# Patient Record
Sex: FEMALE | Race: WHITE | NOT HISPANIC OR LATINO | URBAN - METROPOLITAN AREA
[De-identification: names, ages, dates, MRNs, and addresses within clinical notes are randomized per-mention and may not be internally consistent; named-entity substitution may affect disease eponyms.]

---

## 2017-09-15 ENCOUNTER — EMERGENCY (EMERGENCY)
Facility: HOSPITAL | Age: 72
LOS: 1 days | Discharge: HOME | End: 2017-09-15
Admitting: EMERGENCY MEDICINE
Payer: MEDICARE

## 2017-09-15 VITALS
OXYGEN SATURATION: 98 % | SYSTOLIC BLOOD PRESSURE: 160 MMHG | HEART RATE: 92 BPM | RESPIRATION RATE: 20 BRPM | DIASTOLIC BLOOD PRESSURE: 85 MMHG | TEMPERATURE: 97 F
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
## 2017-09-15 DIAGNOSIS — E78.5 HYPERLIPIDEMIA, UNSPECIFIED: ICD-10-CM
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
## 2017-09-15 DIAGNOSIS — S93.401A SPRAIN OF UNSPECIFIED LIGAMENT OF RIGHT ANKLE, INITIAL ENCOUNTER: ICD-10-CM

## 2017-09-15 DIAGNOSIS — Z88.8 ALLERGY STATUS TO OTHER DRUGS, MEDICAMENTS AND BIOLOGICAL SUBSTANCES STATUS: ICD-10-CM
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
## 2017-09-15 DIAGNOSIS — S80.01XA CONTUSION OF RIGHT KNEE, INITIAL ENCOUNTER: ICD-10-CM

## 2017-09-15 DIAGNOSIS — I10 ESSENTIAL (PRIMARY) HYPERTENSION: ICD-10-CM

## 2017-09-15 DIAGNOSIS — M25.561 PAIN IN RIGHT KNEE: ICD-10-CM

## 2017-09-15 DIAGNOSIS — Z79.891 LONG TERM (CURRENT) USE OF OPIATE ANALGESIC: ICD-10-CM

## 2017-09-15 PROCEDURE — 73610 X-RAY EXAM OF ANKLE: CPT

## 2017-09-15 PROCEDURE — 73562 X-RAY EXAM OF KNEE 3: CPT | Mod: 26,RT

## 2017-09-15 PROCEDURE — 29515 APPLICATION SHORT LEG SPLINT: CPT | Mod: RT

## 2017-09-15 PROCEDURE — 73610 X-RAY EXAM OF ANKLE: CPT | Mod: 26,RT

## 2017-09-15 PROCEDURE — 29515 APPLICATION SHORT LEG SPLINT: CPT

## 2017-09-15 PROCEDURE — 99283 EMERGENCY DEPT VISIT LOW MDM: CPT | Mod: 25

## 2017-09-15 PROCEDURE — 99284 EMERGENCY DEPT VISIT MOD MDM: CPT | Mod: 25

## 2017-09-15 PROCEDURE — 73562 X-RAY EXAM OF KNEE 3: CPT

## 2017-09-15 RX ORDER — OXYCODONE AND ACETAMINOPHEN 5; 325 MG/1; MG/1
1 TABLET ORAL ONCE
Qty: 0 | Refills: 0 | Status: DISCONTINUED | OUTPATIENT
Start: 2017-09-15 | End: 2017-09-15

## 2017-09-15 RX ADMIN — OXYCODONE AND ACETAMINOPHEN 1 TABLET(S): 5; 325 TABLET ORAL at 16:09

## 2017-09-15 NOTE — ED PROVIDER NOTE - PROGRESS NOTE DETAILS
PA had difficulty with eprescription, multiple attempts w/o success, called IT w/o resolution of issue.  I did not evaluate the pt but able to prescribe the medication after treatment and evaluation by PA and discussion of IT issue w PA.  ZH pt with DJD explained possibility of fracture or soft tissue damage  and will FU with ortho

## 2017-09-15 NOTE — ED PROVIDER NOTE - MEDICAL DECISION MAKING DETAILS
fall today possible soft tissue knee splinted and ankle splinted pt has cane will DC I have discussed the discharge plan with the patient. The patient agrees with the plan, as discussed.  The patient understands Emergency Department diagnosis is a preliminary diagnosis often based on limited information and that the patient must adhere to the follow-up plan as discussed.  The patient understands that if the symptoms worsen or if prescribed medications do not have the desired/planned effect that the patient may return to the Emergency Department at any time for further evaluation and treatment.

## 2017-09-15 NOTE — ED ADULT NURSE NOTE - OBJECTIVE STATEMENT
72 year old female patient with c/o of bilateral knee pain, R more than L.  S/p of mechanical fall earlier this morning.  Denies loc/dizzines/head trauma.

## 2017-09-15 NOTE — ED PROVIDER NOTE - OBJECTIVE STATEMENT
pt to ed co pain to right knee with swelling and right ankle after trip and fall no LOC no neck nor back pain no head injury no fever no dizzy no headache no chills no NVD no chest pain no SOB no shakes no aches no other  injury no other complaints

## 2017-09-15 NOTE — ED PROVIDER NOTE - CARE PLAN
Principal Discharge DX:	Contusion of right knee, initial encounter  Secondary Diagnosis:	Sprain of other ligament of right ankle, initial encounter

## 2017-09-17 NOTE — ED POST DISCHARGE NOTE - ADDITIONAL DOCUMENTATION
Pt notified, she has ortho follow up at hospitals. She was told get additional imaging CT/MRI then if needed and/or to return to ER for any concerning or worsening sx.

## 2018-12-16 ENCOUNTER — INPATIENT (INPATIENT)
Facility: HOSPITAL | Age: 73
LOS: 1 days | Discharge: OTHER INSTITUTION | DRG: 917 | End: 2018-12-18
Attending: INTERNAL MEDICINE | Admitting: INTERNAL MEDICINE
Payer: MEDICARE

## 2018-12-16 VITALS — DIASTOLIC BLOOD PRESSURE: 92 MMHG | HEART RATE: 90 BPM | SYSTOLIC BLOOD PRESSURE: 124 MMHG | OXYGEN SATURATION: 100 %

## 2018-12-16 LAB
ALBUMIN SERPL ELPH-MCNC: 3.4 G/DL — SIGNIFICANT CHANGE UP (ref 3.3–5)
ALBUMIN SERPL ELPH-MCNC: 3.5 G/DL — SIGNIFICANT CHANGE UP (ref 3.3–5)
ALP SERPL-CCNC: 103 U/L — SIGNIFICANT CHANGE UP (ref 40–120)
ALP SERPL-CCNC: 113 U/L — SIGNIFICANT CHANGE UP (ref 40–120)
ALT FLD-CCNC: 3319 U/L — HIGH (ref 10–45)
ALT FLD-CCNC: 3878 U/L — HIGH (ref 10–45)
ANION GAP SERPL CALC-SCNC: 47 MMOL/L — HIGH (ref 5–17)
ANION GAP SERPL CALC-SCNC: 49 MMOL/L — HIGH (ref 5–17)
APPEARANCE UR: ABNORMAL
APTT BLD: 36 SEC — SIGNIFICANT CHANGE UP (ref 27.5–36.3)
APTT BLD: 42.5 SEC — HIGH (ref 27.5–36.3)
AST SERPL-CCNC: 6740 U/L — HIGH (ref 10–40)
AST SERPL-CCNC: >7000 U/L — SIGNIFICANT CHANGE UP (ref 10–40)
BASE EXCESS BLDA CALC-SCNC: -17.2 MMOL/L — LOW (ref -2–3)
BILIRUB SERPL-MCNC: 2.4 MG/DL — HIGH (ref 0.2–1.2)
BILIRUB SERPL-MCNC: 2.6 MG/DL — HIGH (ref 0.2–1.2)
BILIRUB UR-MCNC: ABNORMAL
BLD GP AB SCN SERPL QL: NEGATIVE — SIGNIFICANT CHANGE UP
BUN SERPL-MCNC: 21 MG/DL — SIGNIFICANT CHANGE UP (ref 7–23)
BUN SERPL-MCNC: 23 MG/DL — SIGNIFICANT CHANGE UP (ref 7–23)
CALCIUM SERPL-MCNC: 8.5 MG/DL — SIGNIFICANT CHANGE UP (ref 8.4–10.5)
CALCIUM SERPL-MCNC: 9 MG/DL — SIGNIFICANT CHANGE UP (ref 8.4–10.5)
CHLORIDE SERPL-SCNC: 86 MMOL/L — LOW (ref 96–108)
CHLORIDE SERPL-SCNC: 92 MMOL/L — LOW (ref 96–108)
CO2 SERPL-SCNC: 6 MMOL/L — CRITICAL LOW (ref 22–31)
CO2 SERPL-SCNC: 7 MMOL/L — CRITICAL LOW (ref 22–31)
COLOR SPEC: YELLOW — SIGNIFICANT CHANGE UP
CREAT SERPL-MCNC: 1.97 MG/DL — HIGH (ref 0.5–1.3)
CREAT SERPL-MCNC: 2.14 MG/DL — HIGH (ref 0.5–1.3)
DIFF PNL FLD: ABNORMAL
EOSINOPHIL NFR BLD AUTO: 1 % — SIGNIFICANT CHANGE UP (ref 0–6)
ETHANOL SERPL-MCNC: <10 MG/DL — SIGNIFICANT CHANGE UP (ref 0–10)
GAS PNL BLDV: SIGNIFICANT CHANGE UP
GLUCOSE SERPL-MCNC: 144 MG/DL — HIGH (ref 70–99)
GLUCOSE SERPL-MCNC: 364 MG/DL — HIGH (ref 70–99)
GLUCOSE UR QL: NEGATIVE — SIGNIFICANT CHANGE UP
HCO3 BLDA-SCNC: 8 MMOL/L — CRITICAL LOW (ref 21–28)
HCT VFR BLD CALC: 31.9 % — LOW (ref 34.5–45)
HCT VFR BLD CALC: 31.9 % — LOW (ref 34.5–45)
HGB BLD-MCNC: 9.2 G/DL — LOW (ref 11.5–15.5)
HGB BLD-MCNC: 9.3 G/DL — LOW (ref 11.5–15.5)
INR BLD: 3.83 — HIGH (ref 0.88–1.16)
INR BLD: 4.12 — HIGH (ref 0.88–1.16)
KETONES UR-MCNC: NEGATIVE — SIGNIFICANT CHANGE UP
LACTATE SERPL-SCNC: 18 MMOL/L — CRITICAL HIGH (ref 0.5–2)
LACTATE SERPL-SCNC: 20 MMOL/L — CRITICAL HIGH (ref 0.5–2)
LACTATE SERPL-SCNC: 21 MMOL/L — CRITICAL HIGH (ref 0.5–2)
LEUKOCYTE ESTERASE UR-ACNC: NEGATIVE — SIGNIFICANT CHANGE UP
LYMPHOCYTES # BLD AUTO: 6 % — LOW (ref 13–44)
MAGNESIUM SERPL-MCNC: 1.8 MG/DL — SIGNIFICANT CHANGE UP (ref 1.6–2.6)
MCHC RBC-ENTMCNC: 28.8 G/DL — LOW (ref 32–36)
MCHC RBC-ENTMCNC: 29.1 PG — SIGNIFICANT CHANGE UP (ref 27–34)
MCHC RBC-ENTMCNC: 29.2 G/DL — LOW (ref 32–36)
MCHC RBC-ENTMCNC: 29.4 PG — SIGNIFICANT CHANGE UP (ref 27–34)
MCV RBC AUTO: 100.9 FL — HIGH (ref 80–100)
MCV RBC AUTO: 100.9 FL — HIGH (ref 80–100)
MONOCYTES NFR BLD AUTO: 1 % — LOW (ref 2–14)
NEUTROPHILS NFR BLD AUTO: 83 % — HIGH (ref 43–77)
NITRITE UR-MCNC: NEGATIVE — SIGNIFICANT CHANGE UP
OSMOLALITY SERPL: 318 MOSM/KG — HIGH (ref 280–301)
OSMOLALITY UR: 345 MOSMOL/KG — SIGNIFICANT CHANGE UP (ref 100–650)
PCO2 BLDA: 18 MMHG — LOW (ref 32–45)
PCP SPEC-MCNC: SIGNIFICANT CHANGE UP
PH BLDA: 7.27 — LOW (ref 7.35–7.45)
PH UR: 5 — SIGNIFICANT CHANGE UP (ref 5–8)
PHOSPHATE SERPL-MCNC: 9.8 MG/DL — HIGH (ref 2.5–4.5)
PLATELET # BLD AUTO: 406 K/UL — HIGH (ref 150–400)
PLATELET # BLD AUTO: 412 K/UL — HIGH (ref 150–400)
PO2 BLDA: 233 MMHG — HIGH (ref 83–108)
POTASSIUM SERPL-MCNC: 3.7 MMOL/L — SIGNIFICANT CHANGE UP (ref 3.5–5.3)
POTASSIUM SERPL-MCNC: 4.3 MMOL/L — SIGNIFICANT CHANGE UP (ref 3.5–5.3)
POTASSIUM SERPL-SCNC: 3.7 MMOL/L — SIGNIFICANT CHANGE UP (ref 3.5–5.3)
POTASSIUM SERPL-SCNC: 4.3 MMOL/L — SIGNIFICANT CHANGE UP (ref 3.5–5.3)
PROT SERPL-MCNC: 5.8 G/DL — LOW (ref 6–8.3)
PROT SERPL-MCNC: 6.2 G/DL — SIGNIFICANT CHANGE UP (ref 6–8.3)
PROT UR-MCNC: 30 MG/DL
PROTHROM AB SERPL-ACNC: 45.1 SEC — HIGH (ref 10–12.9)
PROTHROM AB SERPL-ACNC: 48.7 SEC — HIGH (ref 10–12.9)
RBC # BLD: 3.16 M/UL — LOW (ref 3.8–5.2)
RBC # BLD: 3.16 M/UL — LOW (ref 3.8–5.2)
RBC # FLD: 15.7 % — SIGNIFICANT CHANGE UP (ref 10.3–16.9)
RBC # FLD: 15.9 % — SIGNIFICANT CHANGE UP (ref 10.3–16.9)
RH IG SCN BLD-IMP: NEGATIVE — SIGNIFICANT CHANGE UP
SALICYLATES SERPL-MCNC: 1.6 MG/DL — LOW (ref 2.8–20)
SAO2 % BLDA: 100 % — SIGNIFICANT CHANGE UP (ref 95–100)
SODIUM SERPL-SCNC: 142 MMOL/L — SIGNIFICANT CHANGE UP (ref 135–145)
SODIUM SERPL-SCNC: 145 MMOL/L — SIGNIFICANT CHANGE UP (ref 135–145)
SP GR SPEC: >=1.03 — SIGNIFICANT CHANGE UP (ref 1–1.03)
TROPONIN T SERPL-MCNC: 0.11 NG/ML — CRITICAL HIGH (ref 0–0.01)
UROBILINOGEN FLD QL: 0.2 E.U./DL — SIGNIFICANT CHANGE UP
WBC # BLD: 23.3 K/UL — HIGH (ref 3.8–10.5)
WBC # BLD: 23.5 K/UL — HIGH (ref 3.8–10.5)
WBC # FLD AUTO: 23.3 K/UL — HIGH (ref 3.8–10.5)
WBC # FLD AUTO: 23.5 K/UL — HIGH (ref 3.8–10.5)

## 2018-12-16 PROCEDURE — 72125 CT NECK SPINE W/O DYE: CPT | Mod: 26

## 2018-12-16 PROCEDURE — 71045 X-RAY EXAM CHEST 1 VIEW: CPT | Mod: 26,76

## 2018-12-16 PROCEDURE — 99291 CRITICAL CARE FIRST HOUR: CPT | Mod: 25

## 2018-12-16 PROCEDURE — 74176 CT ABD & PELVIS W/O CONTRAST: CPT | Mod: 26

## 2018-12-16 PROCEDURE — 74018 RADEX ABDOMEN 1 VIEW: CPT | Mod: 26

## 2018-12-16 PROCEDURE — 71250 CT THORAX DX C-: CPT | Mod: 26

## 2018-12-16 PROCEDURE — 70450 CT HEAD/BRAIN W/O DYE: CPT | Mod: 26

## 2018-12-16 PROCEDURE — 36556 INSERT NON-TUNNEL CV CATH: CPT

## 2018-12-16 PROCEDURE — 99292 CRITICAL CARE ADDL 30 MIN: CPT | Mod: 25

## 2018-12-16 PROCEDURE — 31500 INSERT EMERGENCY AIRWAY: CPT

## 2018-12-16 RX ORDER — PROPOFOL 10 MG/ML
5 INJECTION, EMULSION INTRAVENOUS
Qty: 1000 | Refills: 0 | Status: DISCONTINUED | OUTPATIENT
Start: 2018-12-16 | End: 2018-12-17

## 2018-12-16 RX ORDER — DEXTROSE 50 % IN WATER 50 %
25 SYRINGE (ML) INTRAVENOUS ONCE
Qty: 0 | Refills: 0 | Status: DISCONTINUED | OUTPATIENT
Start: 2018-12-16 | End: 2018-12-17

## 2018-12-16 RX ORDER — SODIUM CHLORIDE 9 MG/ML
1000 INJECTION, SOLUTION INTRAVENOUS ONCE
Qty: 0 | Refills: 0 | Status: COMPLETED | OUTPATIENT
Start: 2018-12-16 | End: 2018-12-16

## 2018-12-16 RX ORDER — LINEZOLID 600 MG/300ML
600 INJECTION, SOLUTION INTRAVENOUS EVERY 12 HOURS
Qty: 0 | Refills: 0 | Status: COMPLETED | OUTPATIENT
Start: 2018-12-16 | End: 2018-12-17

## 2018-12-16 RX ORDER — SODIUM CHLORIDE 9 MG/ML
1000 INJECTION, SOLUTION INTRAVENOUS
Qty: 0 | Refills: 0 | Status: DISCONTINUED | OUTPATIENT
Start: 2018-12-16 | End: 2018-12-17

## 2018-12-16 RX ORDER — LINEZOLID 600 MG/300ML
600 INJECTION, SOLUTION INTRAVENOUS EVERY 12 HOURS
Qty: 0 | Refills: 0 | Status: DISCONTINUED | OUTPATIENT
Start: 2018-12-16 | End: 2018-12-17

## 2018-12-16 RX ORDER — PIPERACILLIN AND TAZOBACTAM 4; .5 G/20ML; G/20ML
3.38 INJECTION, POWDER, LYOPHILIZED, FOR SOLUTION INTRAVENOUS ONCE
Qty: 0 | Refills: 0 | Status: COMPLETED | OUTPATIENT
Start: 2018-12-16 | End: 2018-12-16

## 2018-12-16 RX ORDER — GLUCAGON INJECTION, SOLUTION 0.5 MG/.1ML
1 INJECTION, SOLUTION SUBCUTANEOUS ONCE
Qty: 0 | Refills: 0 | Status: DISCONTINUED | OUTPATIENT
Start: 2018-12-16 | End: 2018-12-17

## 2018-12-16 RX ORDER — NOREPINEPHRINE BITARTRATE/D5W 8 MG/250ML
0.05 PLASTIC BAG, INJECTION (ML) INTRAVENOUS
Qty: 8 | Refills: 0 | Status: DISCONTINUED | OUTPATIENT
Start: 2018-12-16 | End: 2018-12-17

## 2018-12-16 RX ORDER — PANTOPRAZOLE SODIUM 20 MG/1
40 TABLET, DELAYED RELEASE ORAL
Qty: 0 | Refills: 0 | Status: DISCONTINUED | OUTPATIENT
Start: 2018-12-17 | End: 2018-12-18

## 2018-12-16 RX ORDER — SODIUM BICARBONATE 1 MEQ/ML
50 SYRINGE (ML) INTRAVENOUS ONCE
Qty: 0 | Refills: 0 | Status: COMPLETED | OUTPATIENT
Start: 2018-12-16 | End: 2018-12-16

## 2018-12-16 RX ORDER — PANTOPRAZOLE SODIUM 20 MG/1
80 TABLET, DELAYED RELEASE ORAL ONCE
Qty: 0 | Refills: 0 | Status: COMPLETED | OUTPATIENT
Start: 2018-12-16 | End: 2018-12-16

## 2018-12-16 RX ORDER — SODIUM BICARBONATE 1 MEQ/ML
100 SYRINGE (ML) INTRAVENOUS ONCE
Qty: 0 | Refills: 0 | Status: DISCONTINUED | OUTPATIENT
Start: 2018-12-16 | End: 2018-12-16

## 2018-12-16 RX ORDER — FENTANYL CITRATE 50 UG/ML
50 INJECTION INTRAVENOUS ONCE
Qty: 0 | Refills: 0 | Status: DISCONTINUED | OUTPATIENT
Start: 2018-12-16 | End: 2018-12-16

## 2018-12-16 RX ORDER — SODIUM CHLORIDE 9 MG/ML
3000 INJECTION INTRAMUSCULAR; INTRAVENOUS; SUBCUTANEOUS ONCE
Qty: 0 | Refills: 0 | Status: COMPLETED | OUTPATIENT
Start: 2018-12-16 | End: 2018-12-16

## 2018-12-16 RX ORDER — VANCOMYCIN HCL 1 G
1000 VIAL (EA) INTRAVENOUS ONCE
Qty: 0 | Refills: 0 | Status: DISCONTINUED | OUTPATIENT
Start: 2018-12-16 | End: 2018-12-16

## 2018-12-16 RX ORDER — MIDAZOLAM HYDROCHLORIDE 1 MG/ML
4 INJECTION, SOLUTION INTRAMUSCULAR; INTRAVENOUS ONCE
Qty: 0 | Refills: 0 | Status: DISCONTINUED | OUTPATIENT
Start: 2018-12-16 | End: 2018-12-16

## 2018-12-16 RX ORDER — DEXTROSE 50 % IN WATER 50 %
50 SYRINGE (ML) INTRAVENOUS ONCE
Qty: 0 | Refills: 0 | Status: COMPLETED | OUTPATIENT
Start: 2018-12-16 | End: 2018-12-16

## 2018-12-16 RX ORDER — LINEZOLID 600 MG/300ML
600 INJECTION, SOLUTION INTRAVENOUS EVERY 12 HOURS
Qty: 0 | Refills: 0 | Status: DISCONTINUED | OUTPATIENT
Start: 2018-12-16 | End: 2018-12-16

## 2018-12-16 RX ORDER — DEXTROSE 50 % IN WATER 50 %
15 SYRINGE (ML) INTRAVENOUS ONCE
Qty: 0 | Refills: 0 | Status: DISCONTINUED | OUTPATIENT
Start: 2018-12-16 | End: 2018-12-17

## 2018-12-16 RX ORDER — DEXTROSE 50 % IN WATER 50 %
25 SYRINGE (ML) INTRAVENOUS ONCE
Qty: 0 | Refills: 0 | Status: COMPLETED | OUTPATIENT
Start: 2018-12-16 | End: 2018-12-16

## 2018-12-16 RX ORDER — SUCCINYLCHOLINE CHLORIDE 100 MG/5ML
100 SYRINGE (ML) INTRAVENOUS ONCE
Qty: 0 | Refills: 0 | Status: COMPLETED | OUTPATIENT
Start: 2018-12-16 | End: 2018-12-16

## 2018-12-16 RX ORDER — MIDAZOLAM HYDROCHLORIDE 1 MG/ML
2 INJECTION, SOLUTION INTRAMUSCULAR; INTRAVENOUS ONCE
Qty: 0 | Refills: 0 | Status: DISCONTINUED | OUTPATIENT
Start: 2018-12-16 | End: 2018-12-16

## 2018-12-16 RX ORDER — ETOMIDATE 2 MG/ML
20 INJECTION INTRAVENOUS ONCE
Qty: 0 | Refills: 0 | Status: COMPLETED | OUTPATIENT
Start: 2018-12-16 | End: 2018-12-16

## 2018-12-16 RX ORDER — PIPERACILLIN AND TAZOBACTAM 4; .5 G/20ML; G/20ML
3.38 INJECTION, POWDER, LYOPHILIZED, FOR SOLUTION INTRAVENOUS EVERY 6 HOURS
Qty: 0 | Refills: 0 | Status: DISCONTINUED | OUTPATIENT
Start: 2018-12-17 | End: 2018-12-17

## 2018-12-16 RX ORDER — SODIUM CHLORIDE 9 MG/ML
1000 INJECTION, SOLUTION INTRAVENOUS
Qty: 0 | Refills: 0 | Status: DISCONTINUED | OUTPATIENT
Start: 2018-12-16 | End: 2018-12-18

## 2018-12-16 RX ORDER — INSULIN LISPRO 100/ML
VIAL (ML) SUBCUTANEOUS
Qty: 0 | Refills: 0 | Status: DISCONTINUED | OUTPATIENT
Start: 2018-12-16 | End: 2018-12-17

## 2018-12-16 RX ORDER — DEXTROSE 50 % IN WATER 50 %
12.5 SYRINGE (ML) INTRAVENOUS ONCE
Qty: 0 | Refills: 0 | Status: DISCONTINUED | OUTPATIENT
Start: 2018-12-16 | End: 2018-12-17

## 2018-12-16 RX ORDER — SODIUM CHLORIDE 9 MG/ML
1000 INJECTION, SOLUTION INTRAVENOUS
Qty: 0 | Refills: 0 | Status: DISCONTINUED | OUTPATIENT
Start: 2018-12-16 | End: 2018-12-16

## 2018-12-16 RX ADMIN — Medication 50 MILLILITER(S): at 22:17

## 2018-12-16 RX ADMIN — PROPOFOL 1.57 MICROGRAM(S)/KG/MIN: 10 INJECTION, EMULSION INTRAVENOUS at 21:25

## 2018-12-16 RX ADMIN — ETOMIDATE 20 MILLIGRAM(S): 2 INJECTION INTRAVENOUS at 16:58

## 2018-12-16 RX ADMIN — Medication 50 MILLIEQUIVALENT(S): at 20:22

## 2018-12-16 RX ADMIN — SODIUM CHLORIDE 3000 MILLILITER(S): 9 INJECTION INTRAMUSCULAR; INTRAVENOUS; SUBCUTANEOUS at 16:30

## 2018-12-16 RX ADMIN — MIDAZOLAM HYDROCHLORIDE 4 MILLIGRAM(S): 1 INJECTION, SOLUTION INTRAMUSCULAR; INTRAVENOUS at 19:28

## 2018-12-16 RX ADMIN — SODIUM CHLORIDE 2000 MILLILITER(S): 9 INJECTION, SOLUTION INTRAVENOUS at 23:27

## 2018-12-16 RX ADMIN — SODIUM CHLORIDE 2000 MILLILITER(S): 9 INJECTION, SOLUTION INTRAVENOUS at 20:36

## 2018-12-16 RX ADMIN — Medication 100 MILLIGRAM(S): at 16:59

## 2018-12-16 RX ADMIN — SODIUM CHLORIDE 2000 MILLILITER(S): 9 INJECTION, SOLUTION INTRAVENOUS at 22:02

## 2018-12-16 RX ADMIN — Medication 4.91 MICROGRAM(S)/KG/MIN: at 19:29

## 2018-12-16 RX ADMIN — PIPERACILLIN AND TAZOBACTAM 200 GRAM(S): 4; .5 INJECTION, POWDER, LYOPHILIZED, FOR SOLUTION INTRAVENOUS at 20:22

## 2018-12-16 RX ADMIN — FENTANYL CITRATE 50 MICROGRAM(S): 50 INJECTION INTRAVENOUS at 17:30

## 2018-12-16 RX ADMIN — PANTOPRAZOLE SODIUM 80 MILLIGRAM(S): 20 TABLET, DELAYED RELEASE ORAL at 23:26

## 2018-12-16 RX ADMIN — Medication 25 MILLILITER(S): at 16:30

## 2018-12-16 RX ADMIN — MIDAZOLAM HYDROCHLORIDE 2 MILLIGRAM(S): 1 INJECTION, SOLUTION INTRAMUSCULAR; INTRAVENOUS at 17:30

## 2018-12-16 NOTE — H&P ADULT - NSHPLABSRESULTS_GEN_ALL_CORE
9.2    23.3  )-----------( 406      ( 16 Dec 2018 16:45 )             31.9       12-16    142  |  86<L>  |  23  ----------------------------<  364<H>  4.3   |  7<LL>  |  2.14<H>    Ca    9.0      16 Dec 2018 16:45    TPro  5.8<L>  /  Alb  3.4  /  TBili  2.4<H>  /  DBili  x   /  AST  6740<H>  /  ALT  3319<H>  /  AlkPhos  103  12-16              Urinalysis Basic - ( 16 Dec 2018 19:12 )    Color: Yellow / Appearance: SL Cloudy / SG: >=1.030 / pH: x  Gluc: x / Ketone: NEGATIVE  / Bili: Small / Urobili: 0.2 E.U./dL   Blood: x / Protein: 30 mg/dL / Nitrite: NEGATIVE   Leuk Esterase: NEGATIVE / RBC: x / WBC x   Sq Epi: x / Non Sq Epi: x / Bacteria: x        PT/INR - ( 16 Dec 2018 16:45 )   PT: 45.1 sec;   INR: 3.83          PTT - ( 16 Dec 2018 16:45 )  PTT:36.0 sec    Lactate Trend  12-16 @ 16:48 Lactate:21.0      CARDIAC MARKERS ( 16 Dec 2018 16:45 )  x     / 0.11 ng/mL / 904 U/L / x     / 30.8 ng/mL        CAPILLARY BLOOD GLUCOSE  11 (16 Dec 2018 17:01)      POCT Blood Glucose.: 333 mg/dL (16 Dec 2018 16:49)

## 2018-12-16 NOTE — ED PROVIDER NOTE - SECONDARY DIAGNOSIS.
Altered mental status, unspecified altered mental status type Lactic acidosis Leukocytosis, unspecified type Renal failure, unspecified chronicity Electrolyte disturbance

## 2018-12-16 NOTE — PROCEDURE NOTE - PROCEDURE
<<-----Click on this checkbox to enter Procedure Central line placement  12/16/2018    Active  [id number]

## 2018-12-16 NOTE — H&P ADULT - ASSESSMENT
Assessment and Plan:     73F with h/o EtOH abuse, presenting with Anion gap metabolic acidosis likely due to lactic acidosis, septic shock requiring pressors, transferred to MICU for further monitoring.     Neurology:  - intubated, sedated     Pulmonary:  #Acute     Cardiology    GI:  #Anion gap metabolic acidosis in setting lactic acidosis - etiologies include     #Shock liver with severe transaminitis       :    MSK:    Endocrine:    ID:    FEN: NPO, Replete lytes PRN K>4, Mg>2    Lines/Mo:     PPx: Hep SQ, SCDs    Code: FULL CODE    Dispo: Patient requires continued monitoring in MICU Assessment and Plan:     73F with h/o EtOH abuse, presenting with Anion gap metabolic acidosis likely due to lactic acidosis, septic shock requiring pressors, transferred to MICU for further monitoring.     Neurology:  - intubated, sedated     #Stroke Code - called     Pulmonary:  #Acute respiratory failure s/p intubated   - continue AC/MV  - repeat VBG with pH 7.02/ HCO3 8   -     Cardiology    GI:  #Anion gap metabolic acidosis in setting lactic acidosis - etiologies includes salicylate tox vs. ?methanol poisoning,    - f/u salicylate levels   - Trend CMPs    # Acute liver failure - likely ischemic hepatitis, with severe transaminitis, coagulopathy - likely due to severe hypoperfusion in setting of lactic acidosis   - AST 6740, ALT 3319, INR 3.83         :    MSK:    Endocrine:    ID:    FEN: NPO, Replete lytes PRN K>4, Mg>2    Lines/Mo:     PPx: Hep SQ, SCDs    Code: FULL CODE    Dispo: Patient requires continued monitoring in MICU Assessment and Plan:     73F with h/o EtOH abuse, presenting with Anion gap metabolic acidosis likely due to lactic acidosis, septic shock requiring pressors, transferred to MICU for further monitoring.     Neurology:  - intubated, sedated   #AMS:   - CT head negative for acute stroke  - Pt hypoglycemic on presentation improved w/ D50 x 2  - Trend FS q 1 hrs  - f/u urine tox  - OG tube to suction to reduce chance of aspiration  - Repeat CMP    Pulmonary:  #Acute respiratory failure s/p intubated   - continue AC/MV  - F/u ABG  - check sputum cx    Cardiology  #Troponinemia:   - Q waves in Lead 1 and aVL, incomplete RBBB  - Trend cardiac enzymes to peak    GI:  #Anion gap metabolic acidosis in setting lactic acidosis - etiologies includes salicylate tox vs. ?methanol poisoning,    - f/u salicylate levels   - Trend CMPs    # Acute liver failure - likely ischemic hepatitis, with severe transaminitis, coagulopathy - likely due to severe hypoperfusion in setting of lactic acidosis   - AST 6740, ALT 3319, INR 3.83     :  - Monitor UOP    Endocrine:  - F/u TSH    ID:  - C/w zosyn and linezolid     FEN: NPO, Replete lytes PRN K>4, Mg>2    Lines/Mo:     PPx: Hep SQ, SCDs    Code: FULL CODE    Dispo: Patient requires continued monitoring in MICU Assessment and Plan:     73F with h/o EtOH abuse, presenting with Anion gap metabolic acidosis likely due to lactic acidosis, septic shock requiring pressors, multi-organ failure, transferred to MICU for further monitoring.     Neurology:  - intubated, sedated   #AMS:   - CT head negative for acute stroke  - Pt hypoglycemic on presentation improved w/ D50 x 2  - Trend FS q 1 hrs  - f/u urine tox  - OG tube to suction to reduce chance of aspiration  - Repeat CMP    Pulmonary:  #Acute respiratory failure s/p intubated   - continue AC/MV  - F/u ABG  - check sputum cx    Cardiology  #Troponinemia:   - Q waves in Lead 1 and aVL, incomplete RBBB  - Trend cardiac enzymes to peak    GI:  #Anion gap metabolic acidosis in setting lactic acidosis - etiologies includes salicylate tox vs. ?tylenol tox   - f/u salicylate levels   - Trend CMPs    # Acute liver failure - likely ischemic hepatitis, with severe transaminitis, coagulopathy - likely due to severe hypoperfusion in setting of lactic acidosis   - AST 6740, ALT 3319, INR 3.83   - trend CMPs      :  - Monitor UOP    Endocrine:  - F/u TSH, AM cortisol    ID:  - C/w zosyn and linezolid for broad spectrum coverage, unknown vancomycin allergy     FEN: NPO, Replete lytes PRN K>4, Mg>2  Lines/Mo: L subclavian     PPx: Hep SQ, SCDs  Code: DNR, spoke with HCP, MOLST in the chart  Dispo: Patient requires continued monitoring in MICU

## 2018-12-16 NOTE — H&P ADULT - HISTORY OF PRESENT ILLNESS
73F with PMH Alcohol abuse, previous ER visits for fracture/dislocation BIBA for agitation. Per chart review, patient lives at home independently,  she was found down in her condo for an unknown period of time, a neighbor called 911. Upon arrival to the ED, she was non-verbal, but thrashing extremities, without any purposeful movement, but did withdraw from painful stimuli.     I 73F with PMH Alcohol abuse, previous ER visits for fracture/dislocation BIBA for agitation. Per chart review, patient lives at home independently,  she was found down in her condo for an unknown period of time, a neighbor called 911. Upon arrival to the ED, she was non-verbal, but thrashing extremities, without any purposeful movement, but did withdraw from painful stimuli.     In the ED, vitals were T:   Medications given including Zosyn 3.375 g, 3L NS, dextrose 50% 25mL x1 for hypoglycemia   Patient was intubated, started on pressors, transferred to MICU for further management. 73F with PMH Alcohol abuse, previous ER visits for fracture/dislocation BIBA for agitation. Per chart review, patient lives at home independently,  she was found down in her condo for an unknown period of time, a neighbor called 911. Upon arrival to the ED, she was non-verbal, but thrashing extremities, without any purposeful movement, but did withdraw from painful stimuli.     In the ED, vitals were T: 91  He was found to be hypoglycemia on admission, ?poct of 11, was given 2 amp of dextrose with response to 432, downtrended to 432. Medications given including Zosyn 3.375 g, 3L NS, dextrose 50% 25mL x1 for hypoglycemia . He was fluid resuscitated, started on broad spectrum antibiotics. Pan cultures sent. Patient was intubated, started on pressors, transferred to MICU for further management. 73F with PMH Alcohol abuse, previous ER visits for fracture/dislocation BIBA for agitation. Per chart review, patient lives at home independently,  she was found down in her condo for an unknown period of time, a neighbor called 911. Upon arrival to the ED, she was non-verbal, but thrashing extremities, without any purposeful movement, but did withdraw from painful stimuli.     In the ED, vitals were T: 91  He was found to be hypoglycemia on admission, ?poct of 11, was given 2 amp of dextrose with response to 432, downtrended to 97 . Medications given including Zosyn 3.375 g, 3L NS, dextrose 50% 25mL x1 for hypoglycemia . He was fluid resuscitated, started on broad spectrum antibiotics. Pan cultures sent. Patient was intubated, started on pressors, transferred to MICU for further management.

## 2018-12-16 NOTE — ED PROVIDER NOTE - MEDICAL DECISION MAKING DETAILS
Patient in ED w AMS - aphasic and thrashing extremities on ED arrival.  CT head non contrast completed and without evidence of hemorrhage.  Patient brought back to trauma bay and intubated for airway protection.  Procedure tolerated well without complication - sedation w fentanyl and versed pushes.  CT imaging of cervical spine, chest, abd and pelvis ordered.  Labs reviewed - IVF boluses x 3L orders and administered, Zosyn also given.  Pan cx sent.  Mo placed.  ICU team in ED and requesting admission to Dr. Peng - ICU team to place further orders for sedation, additional abx, etc and to follow CT imaging which is not yet read.  Patient will be admitted to ICU at this time.

## 2018-12-16 NOTE — H&P ADULT - ATTENDING COMMENTS
Found down, unclear duration. Hx of etoh abuse with recent etoh hospitalization. Intubated in ED. Found to be hypoglycemic, with elevated lactate, renal and hepatic failure. CTH negative. CT chest/abdomen pending. Urine and serum toxicology, including tylenol and salicylates. Repeat labs. If acidosis is not improved needs dialysis. Broad spectrum antibiotics after blood/urine/sputum cultures. Obtain collateral from family. Transfer to MICU from ED.

## 2018-12-16 NOTE — ED ADULT NURSE NOTE - OBJECTIVE STATEMENT
Patient found on the floor in her apartment by her doorman.  Last time patient was seen was this morning collecting her newspaper.  Brought in by EMS only responding to painful stimulation.  Initial FS 11 in ED, given 2 amp Dextrose 50%, repeat FS 94.  At 1658, Intubated with 7.5 ET tube, 21 @ teeth.    Patient rectal temp 90.4, placed on Leda hugger.  Mo 14 FR placed.  Skin intact.  No medical history available. Patient found on the floor in her apartment by her doorman.  Last time patient was seen was this morning collecting her newspaper.  Brought in by EMS only responding to painful stimulation.  Initial FS 11 in ED, given 2 amp Dextrose 50%, repeat FS 94.  At 1658, Intubated with 7.5 ET tube, 21 @ teeth.    Patient rectal temp 90.4, placed on Leda hugger.  Mo 14 FR placed.  Skin intact.  No medical history available.  Skin intact. Patient found on the floor in her apartment by her doorman.  Last time patient was seen was this morning collecting her newspaper.  She was on the floor for an unknown amount of time.  Brought in by EMS only responding to painful stimulation.  Initial FS 11 in ED, given 2 amp Dextrose 50%, repeat FS 94.  At 1658, Intubated with 7.5 ET tube, 21 @ teeth.    Patient rectal temp 90.4, placed on Leda hugger.  Mo 14 FR placed.  Skin intact.  No medical history available.  Skin intact.

## 2018-12-16 NOTE — ED ADULT TRIAGE NOTE - CHIEF COMPLAINT QUOTE
as per EMS "her niece called for a wellness check today and we found her on the floor next to her bed."

## 2018-12-16 NOTE — ED PROVIDER NOTE - OBJECTIVE STATEMENT
73 year old female with history of alcohol abuse presents to ED via EMS transport from home where she is reported to live independently.  Per EMS report, patient was found down in her condo for an unknown period of time - found by one of the staff of her condo who called 911.  On ED arrival, patient is non verbal and thrashing extremities around without any purposeful movement.  She does not follow commands, however it does appear she will w/d from painful stimulus.  Additional history is limited as patient is too sick to communicate and does not present with any family or friends.  Accucheck on ED arrival is noted to read "10."  Patient given 2 amps D50 with subsequent increase in serum glucose to appropriate range, however no improvement in her mentation.  No history of drug abuse, pupils equal and reactive on ED arrival.

## 2018-12-16 NOTE — ED PROVIDER NOTE - CARE PLAN
Principal Discharge DX:	Multi-organ failure with liver failure  Secondary Diagnosis:	Altered mental status, unspecified altered mental status type  Secondary Diagnosis:	Lactic acidosis  Secondary Diagnosis:	Leukocytosis, unspecified type  Secondary Diagnosis:	Electrolyte disturbance  Secondary Diagnosis:	Renal failure, unspecified chronicity

## 2018-12-16 NOTE — CONSULT NOTE ADULT - SUBJECTIVE AND OBJECTIVE BOX
HPI: 73F with PMH Alcohol abuse, previous ER visits for fracture/dislocation BIBA for agitation.  Pt was recently admitted to [facility] in late November for an alcohol related event, neice unable to specify why.  She was then dc'ed to home last Tuesday.  VNS visited her on Friday.  Today, pt's niece did not hear from her so had visiting home services check in on her and found her down on the ground for an unspecified amount of time.  According to EMS, pt had Sunday's paper with her.      In the ED, vitals were T: 91  He was found to be hypoglycemia on admission, poct of 11, was given 2 amp of dextrose with response to 432, downtrended to 432. Medications given including Zosyn 3.375 g, 3L NS, dextrose 50% 25mL x1 for hypoglycemia . He was fluid resuscitated, started on broad spectrum antibiotics. Pan cultures sent. Patient was intubated, started on pressors, transferred to MICU for further management.     [OBJECTIVE]:    Vital Signs:  T(F): 91 (12-16-18 @ 19:09), Max: 91 (12-16-18 @ 19:09)  HR: 102 (12-16-18 @ 21:36) (90 - 102)  BP: 107/46 (12-16-18 @ 21:00) (81/56 - 132/54)  BP(mean): 67 (12-16-18 @ 21:00) (64 - 111)  RR: 26 (12-16-18 @ 21:36) (26 - 42)  SpO2: 100% (12-16-18 @ 21:36) (95% - 100%)  Wt(kg): --  CVP(cm H2O): --  Mode: AC/ CMV (Assist Control/ Continuous Mandatory Ventilation), RR (machine): 24, TV (machine): 420, FiO2: 50, PEEP: 5, ITime: 1, MAP: 9, PIP: 15    12-16 @ 07:01  -  12-16 @ 22:05  --------------------------------------------------------  IN: 1148 mL / OUT: 125 mL / NET: 1023 mL      CAPILLARY BLOOD GLUCOSE  11 (16 Dec 2018 17:01)      POCT Blood Glucose.: 134 mg/dL (16 Dec 2018 19:37)      Physical Exam:    General: intubated, sedated  HEENT: sluggish pupils b/l  Respiratory: CTA b/l, no wheezes, rales or rhonchi  Cardiovascular: Regular, +S1 + S2  Abdomen: Soft, NTND, hypoactive bowel sounds, no rebound, no gaurding, no suprapubic tenderness  Extremities: No cyanosis, no clubbing, no edema, pulses equal, no calf tenderness  Skin: No rashes  Lymphatic: No cervical/supraclavicular LAD  Neurological: Unable to assess    Medications:  MEDICATIONS  (STANDING):  dextrose 5%. 1000 milliLiter(s) (50 mL/Hr) IV Continuous <Continuous>  dextrose 50% Injectable 25 Gram(s) IV Push once  dextrose 50% Injectable 25 Gram(s) IV Push once  dextrose 50% Injectable 12.5 Gram(s) IV Push once  insulin lispro (HumaLOG) corrective regimen sliding scale   SubCutaneous Before meals and at bedtime  linezolid  IVPB 600 milliGRAM(s) IV Intermittent every 12 hours  linezolid  IVPB 600 milliGRAM(s) IV Intermittent every 12 hours  norepinephrine Infusion 0.05 MICROgram(s)/kG/Min (4.913 mL/Hr) IV Continuous <Continuous>  propofol Infusion 5 MICROgram(s)/kG/Min (1.572 mL/Hr) IV Continuous <Continuous>    MEDICATIONS  (PRN):  dextrose 40% Gel 15 Gram(s) Oral once PRN Blood Glucose LESS THAN 70 milliGRAM(s)/deciliter  glucagon  Injectable 1 milliGRAM(s) IntraMuscular once PRN Glucose LESS THAN 70 milligrams/deciliter      Allergies    vancomycin (Unknown)    Intolerances        Labs:                        9.3    23.5  )-----------( 412      ( 16 Dec 2018 19:30 )             31.9     12-16    145  |  92<L>  |  21  ----------------------------<  144<H>  3.7   |  6<LL>  |  1.97<H>    Ca    8.5      16 Dec 2018 19:30  Phos  9.8     12-16  Mg     1.8     12-16    TPro  6.2  /  Alb  3.5  /  TBili  2.6<H>  /  DBili  x   /  AST  >7000  /  ALT  3878<H>  /  AlkPhos  113  12-16    PT/INR - ( 16 Dec 2018 19:30 )   PT: 48.7 sec;   INR: 4.12          PTT - ( 16 Dec 2018 19:30 )  PTT:42.5 sec  Urinalysis Basic - ( 16 Dec 2018 19:12 )    Color: Yellow / Appearance: SL Cloudy / SG: >=1.030 / pH: x  Gluc: x / Ketone: NEGATIVE  / Bili: Small / Urobili: 0.2 E.U./dL   Blood: x / Protein: 30 mg/dL / Nitrite: NEGATIVE   Leuk Esterase: NEGATIVE / RBC: < 5 /HPF / WBC < 5 /HPF   Sq Epi: x / Non Sq Epi: 0-5 /HPF / Bacteria: Present /HPF

## 2018-12-16 NOTE — ED PROVIDER NOTE - NEUROLOGICAL, MLM
Minimally responsive, no purposeful movement to extremities.  + Symmetric extremity thrashing.  Non verbal.

## 2018-12-16 NOTE — CONSULT NOTE ADULT - SUBJECTIVE AND OBJECTIVE BOX
**STROKE CODE CONSULT NOTE**    Last known well time/Time of onset of symptoms: 12/16 AM     HPI: 74 yo F with pmh of alcohol abuse?, previous Er visits for fracture/dislocation BIBA for agitation. Per EMS, patient lives alone, was presumed to be normal this AM since she picked up her newspapers. When her niece was unable to reach her in the morning, she contacted building staff who her trino **STROKE CODE CONSULT NOTE**    Last known well time/Time of onset of symptoms: 12/16 AM     HPI: 74 yo F with pmh of alcohol abuse?, previous Er visits for fracture/dislocation BIBA for agitation. Per EMS, patient lives alone, was presumed to be normal this AM since she picked up her newspapers. When her niece was unable to reach her in the morning, she contacted building staff who found her on the floor. EMS was called and she was found to have AMS with agitation. She was brought to [facility] ED. Patient was agitated on arrival, making non purposeful movements. She was screaming but not responding to verbal stimuli. Extremities were cool to touch. FInger stick glucose was noted to be 11. she was given 2amps D50 which led to resolution of hypoglycemia. CT head did not show any acute changes. PAtient was intubated for airway protection. CTA and perfusion could not be performed. She was found to have multiorgan failure on labs and was admitted to MICU for further management.        PAST MEDICAL & SURGICAL HISTORY:  HLD (hyperlipidemia)  HTN (hypertension)  No significant past surgical history      FAMILY HISTORY: Unknown       SOCIAL HISTORY:  Smoking Cesation: Unable to discuss     ROS: Unable to obtain     MEDICATIONS  (STANDING):  norepinephrine Infusion 0.05 MICROgram(s)/kG/Min (4.913 mL/Hr) IV Continuous <Continuous>  piperacillin/tazobactam IVPB. 3.375 Gram(s) IV Intermittent once  propofol Infusion 5 MICROgram(s)/kG/Min (1.572 mL/Hr) IV Continuous <Continuous>    MEDICATIONS  (PRN):      Allergies    vancomycin (Unknown)    Intolerances        Vital Signs Last 24 Hrs  T(C): 32.8 (16 Dec 2018 19:09), Max: 32.8 (16 Dec 2018 19:09)  T(F): 91 (16 Dec 2018 19:09), Max: 91 (16 Dec 2018 19:09)  HR: 90 (16 Dec 2018 19:15) (90 - 94)  BP: 99/51 (16 Dec 2018 19:15) (81/56 - 130/53)  BP(mean): 72 (16 Dec 2018 19:15) (64 - 111)  RR: 26 (16 Dec 2018 19:05) (26 - 26)  SpO2: 100% (16 Dec 2018 19:15) (95% - 100%)    PHYSICAL EXAM:  Constitutional: Agitated prior to intubation, not responsive to verbal stimuli, withdrew to pain.   Cardiovascular: Tachycardic. , no appreciable murmurs; no carotid bruits  Neurologic:  Mental status: Agitated, not making any purposeful movements, did not respond to verbal stimuli. Withdrew to pain   Cranial nerves: Pupils 4mm and sluggish, Blinked to visual threat in all visual fields. No nystagmus. No gross facial asymmetry noted.   Motor:  Unable to fully test but did have 5/5 strength in all extremities .  Sensation: Withdrew to pain in all extremities   Coordination: Unable to test   Reflexes: 2+ in upper and lower extremities, downgoing toes bilaterally  Gait: Unable to test     NIHSS: 15     Fingerstick Blood Glucose: CAPILLARY BLOOD GLUCOSE  11 (16 Dec 2018 17:01)      POCT Blood Glucose.: 134 mg/dL (16 Dec 2018 19:37)       LABS:                        9.3    23.5  )-----------( 412      ( 16 Dec 2018 19:30 )             31.9     12-16    142  |  86<L>  |  23  ----------------------------<  364<H>  4.3   |  7<LL>  |  2.14<H>    Ca    9.0      16 Dec 2018 16:45    TPro  5.8<L>  /  Alb  3.4  /  TBili  2.4<H>  /  DBili  x   /  AST  6740<H>  /  ALT  3319<H>  /  AlkPhos  103  12-16    PT/INR - ( 16 Dec 2018 19:30 )   PT: 48.7 sec;   INR: 4.12          PTT - ( 16 Dec 2018 19:30 )  PTT:42.5 sec  CARDIAC MARKERS ( 16 Dec 2018 16:45 )  x     / 0.11 ng/mL / 904 U/L / x     / 30.8 ng/mL      Urinalysis Basic - ( 16 Dec 2018 19:12 )    Color: Yellow / Appearance: SL Cloudy / SG: >=1.030 / pH: x  Gluc: x / Ketone: NEGATIVE  / Bili: Small / Urobili: 0.2 E.U./dL   Blood: x / Protein: 30 mg/dL / Nitrite: NEGATIVE   Leuk Esterase: NEGATIVE / RBC: < 5 /HPF / WBC < 5 /HPF   Sq Epi: x / Non Sq Epi: 0-5 /HPF / Bacteria: Present /HPF        RADIOLOGY & ADDITIONAL STUDIES:    IV-tPA (Y/N): No                                   Bolus time:  Reason IV-tPA not given: Likely toxic metabolic encephalopathy     ASSESSMENT/PLAN:

## 2018-12-16 NOTE — H&P ADULT - NSHPPHYSICALEXAM_GEN_ALL_CORE
.  VITAL SIGNS:  T(C): 32.8 (12-16-18 @ 19:09), Max: 32.8 (12-16-18 @ 19:09)  T(F): 91 (12-16-18 @ 19:09), Max: 91 (12-16-18 @ 19:09)  HR: 94 (12-16-18 @ 19:05) (90 - 94)  BP: 124/92 (12-16-18 @ 16:06) (124/92 - 124/92)  BP(mean): --  RR: 26 (12-16-18 @ 19:05) (26 - 26)  SpO2: 95% (12-16-18 @ 17:36) (95% - 100%)  Wt(kg): --    PHYSICAL EXAM:    Constitutional: WDWN resting comfortably in bed; NAD  Head: NC/AT  Eyes: PERRL, EOMI, anicteric sclera  ENT: no nasal discharge; uvula midline, no oropharyngeal erythema or exudates; MMM  Neck: supple; no JVD or thyromegaly  Respiratory: CTA B/L; no W/R/R, no retractions  Cardiac: +S1/S2; RRR; no M/R/G; PMI non-displaced  Gastrointestinal: soft, NT/ND; no rebound or guarding; +BSx4  Genitourinary: normal external genitalia  Back: spine midline, no bony tenderness or step-offs; no CVAT B/L  Extremities: WWP, no clubbing or cyanosis; no peripheral edema  Musculoskeletal: NROM x4; no joint swelling, tenderness or erythema  Vascular: 2+ radial, femoral, DP/PT pulses B/L  Dermatologic: skin warm, dry and intact; no rashes, wounds, or scars  Lymphatic: no submandibular or cervical LAD  Neurologic: AAOx3; CNII-XII grossly intact; no focal deficits  Psychiatric: affect and characteristics of appearance, verbalizations, behaviors are appropriate .  VITAL SIGNS:  T(C): 32.8 (12-16-18 @ 19:09), Max: 32.8 (12-16-18 @ 19:09)  T(F): 91 (12-16-18 @ 19:09), Max: 91 (12-16-18 @ 19:09)  HR: 94 (12-16-18 @ 19:05) (90 - 94)  BP: 124/92 (12-16-18 @ 16:06) (124/92 - 124/92)  BP(mean): --  RR: 26 (12-16-18 @ 19:05) (26 - 26)  SpO2: 95% (12-16-18 @ 17:36) (95% - 100%)  Wt(kg): --    PHYSICAL EXAM:    Constitutional: intubated, sedated, on mechanical ventilation, elderly   Head: NC/AT  Eyes: PERRL, EOMI, anicteric sclera  ENT: no nasal discharge; uvula midline, no oropharyngeal erythema or exudates; MMM  Neck: supple; no JVD or thyromegaly  Respiratory: CTA B/L; no W/R/R, no retractions  Cardiac: +S1/S2; RRR; no M/R/G; PMI non-displaced  Gastrointestinal: soft, NT/ND; no rebound or guarding; +BSx4  Genitourinary: normal external genitalia  Back: spine midline, no bony tenderness or step-offs; no CVAT B/L  Extremities: WWP, no clubbing or cyanosis; no peripheral edema  Musculoskeletal: NROM x4; no joint swelling, tenderness or erythema  Vascular: 2+ radial, femoral, DP/PT pulses B/L  Dermatologic: skin warm, dry and intact; no rashes, wounds, or scars  Lymphatic: no submandibular or cervical LAD  Neurologic: intubated, sedated, on vent   Psychiatric: affect and characteristics of appearance, verbalizations, behaviors are appropriate .  VITAL SIGNS:  T(C): 32.8 (12-16-18 @ 19:09), Max: 32.8 (12-16-18 @ 19:09)  T(F): 91 (12-16-18 @ 19:09), Max: 91 (12-16-18 @ 19:09)  HR: 94 (12-16-18 @ 19:05) (90 - 94)  BP: 124/92 (12-16-18 @ 16:06) (124/92 - 124/92)  BP(mean): --  RR: 26 (12-16-18 @ 19:05) (26 - 26)  SpO2: 95% (12-16-18 @ 17:36) (95% - 100%)  Wt(kg): --    PHYSICAL EXAM:    Constitutional: intubated, sedated, on mechanical ventilation, elderly   Head: NC/AT  Eyes: PERRL, EOMI, anicteric sclera  ENT: no nasal discharge; uvula midline, no oropharyngeal erythema or exudates; MMM  Neck: supple; no JVD or thyromegaly  Respiratory: CTA B/L; no W/R/R, no retractions  Cardiac: +S1/S2; RRR; no M/R/G; PMI non-displaced  Gastrointestinal: soft, NT/ND; no rebound or guarding; +BSx4  Genitourinary: normal external genitalia  Back: spine midline, no bony tenderness or step-offs  Extremities: WWP, no clubbing or cyanosis; no peripheral edema  Musculoskeletal: NROM x4; no joint swelling, tenderness or erythema  Vascular: 2+ radial, femoral, DP/PT pulses B/L  Dermatologic: skin warm, dry and intact; no rashes, wounds, or scars  Lymphatic: no submandibular or cervical LAD  Neurologic: intubated, sedated, on vent   Psychiatric: affect and characteristics of appearance, verbalizations, behaviors are appropriate

## 2018-12-17 ENCOUNTER — TRANSCRIPTION ENCOUNTER (OUTPATIENT)
Age: 73
End: 2018-12-17

## 2018-12-17 DIAGNOSIS — N17.9 ACUTE KIDNEY FAILURE, UNSPECIFIED: ICD-10-CM

## 2018-12-17 DIAGNOSIS — E87.2 ACIDOSIS: ICD-10-CM

## 2018-12-17 LAB
ALBUMIN SERPL ELPH-MCNC: 2.2 G/DL — LOW (ref 3.3–5)
ALBUMIN SERPL ELPH-MCNC: 2.3 G/DL — LOW (ref 3.3–5)
ALBUMIN SERPL ELPH-MCNC: 2.5 G/DL — LOW (ref 3.3–5)
ALBUMIN SERPL ELPH-MCNC: 2.6 G/DL — LOW (ref 3.3–5)
ALBUMIN SERPL ELPH-MCNC: 2.9 G/DL — LOW (ref 3.3–5)
ALBUMIN SERPL ELPH-MCNC: 3.2 G/DL — LOW (ref 3.3–5)
ALP SERPL-CCNC: 113 U/L — SIGNIFICANT CHANGE UP (ref 40–120)
ALP SERPL-CCNC: 117 U/L — SIGNIFICANT CHANGE UP (ref 40–120)
ALP SERPL-CCNC: 120 U/L — SIGNIFICANT CHANGE UP (ref 40–120)
ALP SERPL-CCNC: 126 U/L — HIGH (ref 40–120)
ALP SERPL-CCNC: 127 U/L — HIGH (ref 40–120)
ALP SERPL-CCNC: 137 U/L — HIGH (ref 40–120)
ALT FLD-CCNC: 3529 U/L — HIGH (ref 10–45)
ALT FLD-CCNC: 4034 U/L — HIGH (ref 10–45)
ALT FLD-CCNC: 4161 U/L — HIGH (ref 10–45)
ALT FLD-CCNC: 4251 U/L — HIGH (ref 10–45)
ALT FLD-CCNC: 4547 U/L — HIGH (ref 10–45)
ALT FLD-CCNC: 5231 U/L — HIGH (ref 10–45)
ANION GAP SERPL CALC-SCNC: 41 MMOL/L — HIGH (ref 5–17)
ANION GAP SERPL CALC-SCNC: 43 MMOL/L — HIGH (ref 5–17)
ANION GAP SERPL CALC-SCNC: 45 MMOL/L — HIGH (ref 5–17)
ANION GAP SERPL CALC-SCNC: 47 MMOL/L — HIGH (ref 5–17)
ANION GAP SERPL CALC-SCNC: 47 MMOL/L — HIGH (ref 5–17)
ANION GAP SERPL CALC-SCNC: 49 MMOL/L — HIGH (ref 5–17)
APTT BLD: 36.8 SEC — HIGH (ref 27.5–36.3)
APTT BLD: 37.7 SEC — HIGH (ref 27.5–36.3)
APTT BLD: 38 SEC — HIGH (ref 27.5–36.3)
AST SERPL-CCNC: >7000 U/L — HIGH (ref 10–40)
BASE EXCESS BLDA CALC-SCNC: -11.1 MMOL/L — LOW (ref -2–3)
BASE EXCESS BLDA CALC-SCNC: -14.1 MMOL/L — LOW (ref -2–3)
BASE EXCESS BLDA CALC-SCNC: -14.7 MMOL/L — LOW (ref -2–3)
BASE EXCESS BLDA CALC-SCNC: -15.1 MMOL/L — LOW (ref -2–3)
BASOPHILS NFR BLD AUTO: 0.1 % — SIGNIFICANT CHANGE UP (ref 0–2)
BILIRUB SERPL-MCNC: 2.7 MG/DL — HIGH (ref 0.2–1.2)
BILIRUB SERPL-MCNC: 2.8 MG/DL — HIGH (ref 0.2–1.2)
BILIRUB SERPL-MCNC: 2.9 MG/DL — HIGH (ref 0.2–1.2)
BILIRUB SERPL-MCNC: 2.9 MG/DL — HIGH (ref 0.2–1.2)
BLD GP AB SCN SERPL QL: NEGATIVE — SIGNIFICANT CHANGE UP
BUN SERPL-MCNC: 16 MG/DL — SIGNIFICANT CHANGE UP (ref 7–23)
BUN SERPL-MCNC: 19 MG/DL — SIGNIFICANT CHANGE UP (ref 7–23)
BUN SERPL-MCNC: 20 MG/DL — SIGNIFICANT CHANGE UP (ref 7–23)
CALCIUM SERPL-MCNC: 6.9 MG/DL — LOW (ref 8.4–10.5)
CALCIUM SERPL-MCNC: 6.9 MG/DL — LOW (ref 8.4–10.5)
CALCIUM SERPL-MCNC: 7.3 MG/DL — LOW (ref 8.4–10.5)
CALCIUM SERPL-MCNC: 7.5 MG/DL — LOW (ref 8.4–10.5)
CALCIUM SERPL-MCNC: 7.7 MG/DL — LOW (ref 8.4–10.5)
CALCIUM SERPL-MCNC: 7.7 MG/DL — LOW (ref 8.4–10.5)
CHLORIDE SERPL-SCNC: 91 MMOL/L — LOW (ref 96–108)
CHLORIDE SERPL-SCNC: 92 MMOL/L — LOW (ref 96–108)
CHLORIDE SERPL-SCNC: 93 MMOL/L — LOW (ref 96–108)
CHLORIDE SERPL-SCNC: 93 MMOL/L — LOW (ref 96–108)
CHLORIDE SERPL-SCNC: 94 MMOL/L — LOW (ref 96–108)
CHLORIDE SERPL-SCNC: 95 MMOL/L — LOW (ref 96–108)
CK MB CFR SERPL CALC: 102.6 NG/ML — HIGH (ref 0–6.7)
CK MB CFR SERPL CALC: 105.9 NG/ML — HIGH (ref 0–6.7)
CK SERPL-CCNC: 3227 U/L — HIGH (ref 25–170)
CK SERPL-CCNC: 4565 U/L — HIGH (ref 25–170)
CK SERPL-CCNC: 4764 U/L — HIGH (ref 25–170)
CO2 SERPL-SCNC: 10 MMOL/L — CRITICAL LOW (ref 22–31)
CO2 SERPL-SCNC: 10 MMOL/L — CRITICAL LOW (ref 22–31)
CO2 SERPL-SCNC: 11 MMOL/L — LOW (ref 22–31)
CO2 SERPL-SCNC: 13 MMOL/L — LOW (ref 22–31)
CO2 SERPL-SCNC: 7 MMOL/L — CRITICAL LOW (ref 22–31)
CO2 SERPL-SCNC: 7 MMOL/L — CRITICAL LOW (ref 22–31)
CREAT ?TM UR-MCNC: 62 MG/DL — SIGNIFICANT CHANGE UP
CREAT SERPL-MCNC: 1.95 MG/DL — HIGH (ref 0.5–1.3)
CREAT SERPL-MCNC: 1.99 MG/DL — HIGH (ref 0.5–1.3)
CREAT SERPL-MCNC: 2.13 MG/DL — HIGH (ref 0.5–1.3)
CREAT SERPL-MCNC: 2.28 MG/DL — HIGH (ref 0.5–1.3)
CREAT SERPL-MCNC: 2.37 MG/DL — HIGH (ref 0.5–1.3)
CREAT SERPL-MCNC: 2.45 MG/DL — HIGH (ref 0.5–1.3)
FACT II INHIB PPP-ACNC: 25 % — LOW (ref 74–142)
FACT V ACT/NOR PPP: 4 % — LOW (ref 70–143)
FACT VII ACT/NOR PPP: 2 % — LOW (ref 53–149)
GLUCOSE SERPL-MCNC: 138 MG/DL — HIGH (ref 70–99)
GLUCOSE SERPL-MCNC: 145 MG/DL — HIGH (ref 70–99)
GLUCOSE SERPL-MCNC: 269 MG/DL — HIGH (ref 70–99)
GLUCOSE SERPL-MCNC: 275 MG/DL — HIGH (ref 70–99)
GLUCOSE SERPL-MCNC: 46 MG/DL — LOW (ref 70–99)
GLUCOSE SERPL-MCNC: 89 MG/DL — SIGNIFICANT CHANGE UP (ref 70–99)
HBA1C BLD-MCNC: 5 % — SIGNIFICANT CHANGE UP (ref 4–5.6)
HCO3 BLDA-SCNC: 10 MMOL/L — LOW (ref 21–28)
HCO3 BLDA-SCNC: 10 MMOL/L — LOW (ref 21–28)
HCO3 BLDA-SCNC: 11 MMOL/L — LOW (ref 21–28)
HCO3 BLDA-SCNC: 11 MMOL/L — LOW (ref 21–28)
HCT VFR BLD CALC: 27.4 % — LOW (ref 34.5–45)
HCT VFR BLD CALC: 28 % — LOW (ref 34.5–45)
HCT VFR BLD CALC: 28.2 % — LOW (ref 34.5–45)
HCT VFR BLD CALC: 29.6 % — LOW (ref 34.5–45)
HGB BLD-MCNC: 8.1 G/DL — LOW (ref 11.5–15.5)
HGB BLD-MCNC: 8.4 G/DL — LOW (ref 11.5–15.5)
HGB BLD-MCNC: 8.4 G/DL — LOW (ref 11.5–15.5)
HGB BLD-MCNC: 8.7 G/DL — LOW (ref 11.5–15.5)
INR BLD: 6.46 — CRITICAL HIGH (ref 0.88–1.16)
INR BLD: 6.72 — CRITICAL HIGH (ref 0.88–1.16)
INR BLD: 7.68 — CRITICAL HIGH (ref 0.88–1.16)
LACTATE SERPL-SCNC: 16 MMOL/L — CRITICAL HIGH (ref 0.5–2)
LACTATE SERPL-SCNC: 19 MMOL/L — CRITICAL HIGH (ref 0.5–2)
LACTATE SERPL-SCNC: 20 MMOL/L — CRITICAL HIGH (ref 0.5–2)
LACTATE SERPL-SCNC: 21 MMOL/L — CRITICAL HIGH (ref 0.5–2)
LYMPHOCYTES # BLD AUTO: 3 % — LOW (ref 13–44)
LYMPHOCYTES # BLD AUTO: 5.3 % — LOW (ref 13–44)
MAGNESIUM SERPL-MCNC: 1.2 MG/DL — LOW (ref 1.6–2.6)
MAGNESIUM SERPL-MCNC: 1.3 MG/DL — LOW (ref 1.6–2.6)
MCHC RBC-ENTMCNC: 28.9 PG — SIGNIFICANT CHANGE UP (ref 27–34)
MCHC RBC-ENTMCNC: 29.1 PG — SIGNIFICANT CHANGE UP (ref 27–34)
MCHC RBC-ENTMCNC: 29.4 G/DL — LOW (ref 32–36)
MCHC RBC-ENTMCNC: 29.6 G/DL — LOW (ref 32–36)
MCHC RBC-ENTMCNC: 29.6 PG — SIGNIFICANT CHANGE UP (ref 27–34)
MCHC RBC-ENTMCNC: 29.7 PG — SIGNIFICANT CHANGE UP (ref 27–34)
MCHC RBC-ENTMCNC: 29.8 G/DL — LOW (ref 32–36)
MCHC RBC-ENTMCNC: 30 G/DL — LOW (ref 32–36)
MCV RBC AUTO: 100.4 FL — HIGH (ref 80–100)
MCV RBC AUTO: 96.9 FL — SIGNIFICANT CHANGE UP (ref 80–100)
MCV RBC AUTO: 98.3 FL — SIGNIFICANT CHANGE UP (ref 80–100)
MCV RBC AUTO: 99.3 FL — SIGNIFICANT CHANGE UP (ref 80–100)
MONOCYTES NFR BLD AUTO: 2.8 % — SIGNIFICANT CHANGE UP (ref 2–14)
MONOCYTES NFR BLD AUTO: 3 % — SIGNIFICANT CHANGE UP (ref 2–14)
NEUTROPHILS NFR BLD AUTO: 49 % — SIGNIFICANT CHANGE UP (ref 43–77)
NEUTROPHILS NFR BLD AUTO: 91.8 % — HIGH (ref 43–77)
PCO2 BLDA: 17 MMHG — LOW (ref 32–45)
PCO2 BLDA: 20 MMHG — LOW (ref 32–45)
PCO2 BLDA: 21 MMHG — LOW (ref 32–45)
PCO2 BLDA: 25 MMHG — LOW (ref 32–45)
PH BLDA: 7.28 — LOW (ref 7.35–7.45)
PH BLDA: 7.3 — LOW (ref 7.35–7.45)
PH BLDA: 7.3 — LOW (ref 7.35–7.45)
PH BLDA: 7.44 — SIGNIFICANT CHANGE UP (ref 7.35–7.45)
PHOSPHATE SERPL-MCNC: 3.6 MG/DL — SIGNIFICANT CHANGE UP (ref 2.5–4.5)
PHOSPHATE SERPL-MCNC: 4.6 MG/DL — HIGH (ref 2.5–4.5)
PLATELET # BLD AUTO: 290 K/UL — SIGNIFICANT CHANGE UP (ref 150–400)
PLATELET # BLD AUTO: 307 K/UL — SIGNIFICANT CHANGE UP (ref 150–400)
PLATELET # BLD AUTO: 315 K/UL — SIGNIFICANT CHANGE UP (ref 150–400)
PLATELET # BLD AUTO: 323 K/UL — SIGNIFICANT CHANGE UP (ref 150–400)
PO2 BLDA: 105 MMHG — SIGNIFICANT CHANGE UP (ref 83–108)
PO2 BLDA: 111 MMHG — HIGH (ref 83–108)
PO2 BLDA: 175 MMHG — HIGH (ref 83–108)
PO2 BLDA: 79 MMHG — LOW (ref 83–108)
POTASSIUM SERPL-MCNC: 3.2 MMOL/L — LOW (ref 3.5–5.3)
POTASSIUM SERPL-MCNC: 3.3 MMOL/L — LOW (ref 3.5–5.3)
POTASSIUM SERPL-MCNC: 3.3 MMOL/L — LOW (ref 3.5–5.3)
POTASSIUM SERPL-MCNC: 3.5 MMOL/L — SIGNIFICANT CHANGE UP (ref 3.5–5.3)
POTASSIUM SERPL-MCNC: 4.1 MMOL/L — SIGNIFICANT CHANGE UP (ref 3.5–5.3)
POTASSIUM SERPL-MCNC: 4.3 MMOL/L — SIGNIFICANT CHANGE UP (ref 3.5–5.3)
POTASSIUM SERPL-SCNC: 3.2 MMOL/L — LOW (ref 3.5–5.3)
POTASSIUM SERPL-SCNC: 3.3 MMOL/L — LOW (ref 3.5–5.3)
POTASSIUM SERPL-SCNC: 3.3 MMOL/L — LOW (ref 3.5–5.3)
POTASSIUM SERPL-SCNC: 3.5 MMOL/L — SIGNIFICANT CHANGE UP (ref 3.5–5.3)
POTASSIUM SERPL-SCNC: 4.1 MMOL/L — SIGNIFICANT CHANGE UP (ref 3.5–5.3)
POTASSIUM SERPL-SCNC: 4.3 MMOL/L — SIGNIFICANT CHANGE UP (ref 3.5–5.3)
PROT SERPL-MCNC: 4.2 G/DL — LOW (ref 6–8.3)
PROT SERPL-MCNC: 4.2 G/DL — LOW (ref 6–8.3)
PROT SERPL-MCNC: 4.5 G/DL — LOW (ref 6–8.3)
PROT SERPL-MCNC: 4.7 G/DL — LOW (ref 6–8.3)
PROT SERPL-MCNC: 4.9 G/DL — LOW (ref 6–8.3)
PROT SERPL-MCNC: 5.2 G/DL — LOW (ref 6–8.3)
PROTHROM AB SERPL-ACNC: 77.3 SEC — HIGH (ref 10–12.9)
PROTHROM AB SERPL-ACNC: 80.6 SEC — HIGH (ref 10–12.9)
PROTHROM AB SERPL-ACNC: 92.4 SEC — HIGH (ref 10–12.9)
RBC # BLD: 2.73 M/UL — LOW (ref 3.8–5.2)
RBC # BLD: 2.84 M/UL — LOW (ref 3.8–5.2)
RBC # BLD: 2.89 M/UL — LOW (ref 3.8–5.2)
RBC # BLD: 3.01 M/UL — LOW (ref 3.8–5.2)
RBC # FLD: 15.6 % — SIGNIFICANT CHANGE UP (ref 10.3–16.9)
RBC # FLD: 16.2 % — SIGNIFICANT CHANGE UP (ref 10.3–16.9)
RBC # FLD: 16.4 % — SIGNIFICANT CHANGE UP (ref 10.3–16.9)
RBC # FLD: 16.4 % — SIGNIFICANT CHANGE UP (ref 10.3–16.9)
RH IG SCN BLD-IMP: NEGATIVE — SIGNIFICANT CHANGE UP
SAO2 % BLDA: 94 % — LOW (ref 95–100)
SAO2 % BLDA: 98 % — SIGNIFICANT CHANGE UP (ref 95–100)
SAO2 % BLDA: 98 % — SIGNIFICANT CHANGE UP (ref 95–100)
SAO2 % BLDA: 99 % — SIGNIFICANT CHANGE UP (ref 95–100)
SODIUM SERPL-SCNC: 144 MMOL/L — SIGNIFICANT CHANGE UP (ref 135–145)
SODIUM SERPL-SCNC: 145 MMOL/L — SIGNIFICANT CHANGE UP (ref 135–145)
SODIUM SERPL-SCNC: 149 MMOL/L — HIGH (ref 135–145)
SODIUM SERPL-SCNC: 150 MMOL/L — HIGH (ref 135–145)
SODIUM UR-SCNC: 35 MMOL/L — SIGNIFICANT CHANGE UP
TROPONIN T SERPL-MCNC: 0.31 NG/ML — CRITICAL HIGH (ref 0–0.01)
TROPONIN T SERPL-MCNC: 0.78 NG/ML — CRITICAL HIGH (ref 0–0.01)
TROPONIN T SERPL-MCNC: 1.43 NG/ML — CRITICAL HIGH (ref 0–0.01)
WBC # BLD: 10.4 K/UL — SIGNIFICANT CHANGE UP (ref 3.8–10.5)
WBC # BLD: 12.1 K/UL — HIGH (ref 3.8–10.5)
WBC # BLD: 9.1 K/UL — SIGNIFICANT CHANGE UP (ref 3.8–10.5)
WBC # BLD: 9.8 K/UL — SIGNIFICANT CHANGE UP (ref 3.8–10.5)
WBC # FLD AUTO: 10.4 K/UL — SIGNIFICANT CHANGE UP (ref 3.8–10.5)
WBC # FLD AUTO: 12.1 K/UL — HIGH (ref 3.8–10.5)
WBC # FLD AUTO: 9.1 K/UL — SIGNIFICANT CHANGE UP (ref 3.8–10.5)
WBC # FLD AUTO: 9.8 K/UL — SIGNIFICANT CHANGE UP (ref 3.8–10.5)

## 2018-12-17 PROCEDURE — 71045 X-RAY EXAM CHEST 1 VIEW: CPT | Mod: 26

## 2018-12-17 PROCEDURE — 36620 INSERTION CATHETER ARTERY: CPT | Mod: GC

## 2018-12-17 PROCEDURE — 36556 INSERT NON-TUNNEL CV CATH: CPT | Mod: GC

## 2018-12-17 PROCEDURE — 90945 DIALYSIS ONE EVALUATION: CPT | Mod: GC

## 2018-12-17 PROCEDURE — 99223 1ST HOSP IP/OBS HIGH 75: CPT | Mod: GC,25

## 2018-12-17 RX ORDER — PIPERACILLIN AND TAZOBACTAM 4; .5 G/20ML; G/20ML
3.38 INJECTION, POWDER, LYOPHILIZED, FOR SOLUTION INTRAVENOUS EVERY 6 HOURS
Qty: 0 | Refills: 0 | Status: DISCONTINUED | OUTPATIENT
Start: 2018-12-17 | End: 2018-12-18

## 2018-12-17 RX ORDER — LINEZOLID 600 MG/300ML
300 INJECTION, SOLUTION INTRAVENOUS
Qty: 0 | Refills: 0 | COMMUNITY
Start: 2018-12-17

## 2018-12-17 RX ORDER — PROPOFOL 10 MG/ML
5 INJECTION, EMULSION INTRAVENOUS
Qty: 1000 | Refills: 0 | Status: DISCONTINUED | OUTPATIENT
Start: 2018-12-17 | End: 2018-12-18

## 2018-12-17 RX ORDER — SODIUM BICARBONATE 1 MEQ/ML
50 SYRINGE (ML) INTRAVENOUS ONCE
Qty: 0 | Refills: 0 | Status: COMPLETED | OUTPATIENT
Start: 2018-12-17 | End: 2018-12-17

## 2018-12-17 RX ORDER — DEXTROSE 50 % IN WATER 50 %
50 SYRINGE (ML) INTRAVENOUS ONCE
Qty: 0 | Refills: 0 | Status: COMPLETED | OUTPATIENT
Start: 2018-12-17 | End: 2018-12-17

## 2018-12-17 RX ORDER — PIPERACILLIN AND TAZOBACTAM 4; .5 G/20ML; G/20ML
3.38 INJECTION, POWDER, LYOPHILIZED, FOR SOLUTION INTRAVENOUS
Qty: 0 | Refills: 0 | COMMUNITY
Start: 2018-12-17

## 2018-12-17 RX ORDER — SODIUM BICARBONATE 1 MEQ/ML
50 SYRINGE (ML) INTRAVENOUS
Qty: 0 | Refills: 0 | Status: COMPLETED | OUTPATIENT
Start: 2018-12-17 | End: 2018-12-17

## 2018-12-17 RX ORDER — PROPOFOL 10 MG/ML
1.57 INJECTION, EMULSION INTRAVENOUS
Qty: 0 | Refills: 0 | COMMUNITY
Start: 2018-12-17

## 2018-12-17 RX ORDER — DEXTROSE 10 % IN WATER 10 %
1000 INTRAVENOUS SOLUTION INTRAVENOUS
Qty: 0 | Refills: 0 | COMMUNITY
Start: 2018-12-17

## 2018-12-17 RX ORDER — VASOPRESSIN 20 [USP'U]/ML
0.01 INJECTION INTRAVENOUS
Qty: 100 | Refills: 0 | Status: DISCONTINUED | OUTPATIENT
Start: 2018-12-17 | End: 2018-12-18

## 2018-12-17 RX ORDER — PIPERACILLIN AND TAZOBACTAM 4; .5 G/20ML; G/20ML
2.25 INJECTION, POWDER, LYOPHILIZED, FOR SOLUTION INTRAVENOUS EVERY 6 HOURS
Qty: 0 | Refills: 0 | Status: DISCONTINUED | OUTPATIENT
Start: 2018-12-17 | End: 2018-12-17

## 2018-12-17 RX ORDER — NOREPINEPHRINE BITARTRATE/D5W 8 MG/250ML
0.05 PLASTIC BAG, INJECTION (ML) INTRAVENOUS
Qty: 16 | Refills: 0 | Status: DISCONTINUED | OUTPATIENT
Start: 2018-12-17 | End: 2018-12-18

## 2018-12-17 RX ORDER — CHLORHEXIDINE GLUCONATE 213 G/1000ML
1 SOLUTION TOPICAL
Qty: 0 | Refills: 0 | COMMUNITY
Start: 2018-12-17

## 2018-12-17 RX ORDER — CHLORHEXIDINE GLUCONATE 213 G/1000ML
1 SOLUTION TOPICAL
Qty: 0 | Refills: 0 | Status: DISCONTINUED | OUTPATIENT
Start: 2018-12-17 | End: 2018-12-18

## 2018-12-17 RX ORDER — POTASSIUM CHLORIDE 20 MEQ
20 PACKET (EA) ORAL
Qty: 0 | Refills: 0 | Status: COMPLETED | OUTPATIENT
Start: 2018-12-17 | End: 2018-12-17

## 2018-12-17 RX ORDER — ACETYLCYSTEINE 200 MG/ML
5 VIAL (ML) MISCELLANEOUS ONCE
Qty: 0 | Refills: 0 | Status: COMPLETED | OUTPATIENT
Start: 2018-12-17 | End: 2018-12-17

## 2018-12-17 RX ORDER — DEXTROSE 10 % IN WATER 10 %
1000 INTRAVENOUS SOLUTION INTRAVENOUS
Qty: 0 | Refills: 0 | Status: DISCONTINUED | OUTPATIENT
Start: 2018-12-17 | End: 2018-12-18

## 2018-12-17 RX ORDER — SODIUM CHLORIDE 9 MG/ML
1000 INJECTION, SOLUTION INTRAVENOUS
Qty: 0 | Refills: 0 | Status: DISCONTINUED | OUTPATIENT
Start: 2018-12-17 | End: 2018-12-17

## 2018-12-17 RX ORDER — CHLORHEXIDINE GLUCONATE 213 G/1000ML
15 SOLUTION TOPICAL
Qty: 0 | Refills: 0 | COMMUNITY
Start: 2018-12-17

## 2018-12-17 RX ORDER — VASOPRESSIN 20 [USP'U]/ML
0.6 INJECTION INTRAVENOUS
Qty: 0 | Refills: 0 | COMMUNITY
Start: 2018-12-17

## 2018-12-17 RX ORDER — CHLORHEXIDINE GLUCONATE 213 G/1000ML
15 SOLUTION TOPICAL EVERY 12 HOURS
Qty: 0 | Refills: 0 | Status: DISCONTINUED | OUTPATIENT
Start: 2018-12-17 | End: 2018-12-18

## 2018-12-17 RX ORDER — SODIUM CHLORIDE 9 MG/ML
1000 INJECTION, SOLUTION INTRAVENOUS
Qty: 0 | Refills: 0 | COMMUNITY
Start: 2018-12-17

## 2018-12-17 RX ORDER — NOREPINEPHRINE BITARTRATE/D5W 8 MG/250ML
65 PLASTIC BAG, INJECTION (ML) INTRAVENOUS
Qty: 0 | Refills: 0 | COMMUNITY
Start: 2018-12-17

## 2018-12-17 RX ORDER — PANTOPRAZOLE SODIUM 20 MG/1
40 TABLET, DELAYED RELEASE ORAL
Qty: 0 | Refills: 0 | COMMUNITY
Start: 2018-12-17

## 2018-12-17 RX ORDER — DEXTROSE 10 % IN WATER 10 %
1000 INTRAVENOUS SOLUTION INTRAVENOUS
Qty: 0 | Refills: 0 | Status: DISCONTINUED | OUTPATIENT
Start: 2018-12-17 | End: 2018-12-17

## 2018-12-17 RX ORDER — FENTANYL CITRATE 50 UG/ML
1 INJECTION INTRAVENOUS
Qty: 2500 | Refills: 0 | Status: DISCONTINUED | OUTPATIENT
Start: 2018-12-17 | End: 2018-12-17

## 2018-12-17 RX ORDER — FENTANYL CITRATE 50 UG/ML
5.24 INJECTION INTRAVENOUS
Qty: 0 | Refills: 0 | COMMUNITY
Start: 2018-12-17

## 2018-12-17 RX ORDER — ACETYLCYSTEINE 200 MG/ML
8 VIAL (ML) MISCELLANEOUS ONCE
Qty: 0 | Refills: 0 | Status: COMPLETED | OUTPATIENT
Start: 2018-12-17 | End: 2018-12-17

## 2018-12-17 RX ORDER — LINEZOLID 600 MG/300ML
600 INJECTION, SOLUTION INTRAVENOUS EVERY 12 HOURS
Qty: 0 | Refills: 0 | Status: DISCONTINUED | OUTPATIENT
Start: 2018-12-18 | End: 2018-12-18

## 2018-12-17 RX ORDER — FENTANYL CITRATE 50 UG/ML
1 INJECTION INTRAVENOUS
Qty: 2500 | Refills: 0 | Status: DISCONTINUED | OUTPATIENT
Start: 2018-12-17 | End: 2018-12-18

## 2018-12-17 RX ORDER — INSULIN LISPRO 100/ML
VIAL (ML) SUBCUTANEOUS EVERY 6 HOURS
Qty: 0 | Refills: 0 | Status: DISCONTINUED | OUTPATIENT
Start: 2018-12-17 | End: 2018-12-17

## 2018-12-17 RX ORDER — ACETYLCYSTEINE 200 MG/ML
5 VIAL (ML) MISCELLANEOUS ONCE
Qty: 0 | Refills: 0 | Status: DISCONTINUED | OUTPATIENT
Start: 2018-12-18 | End: 2018-12-18

## 2018-12-17 RX ORDER — ACETYLCYSTEINE 200 MG/ML
25 VIAL (ML) MISCELLANEOUS
Qty: 0 | Refills: 0 | COMMUNITY
Start: 2018-12-17

## 2018-12-17 RX ORDER — ACETYLCYSTEINE 200 MG/ML
2.6 VIAL (ML) MISCELLANEOUS ONCE
Qty: 0 | Refills: 0 | Status: COMPLETED | OUTPATIENT
Start: 2018-12-17 | End: 2018-12-17

## 2018-12-17 RX ADMIN — PIPERACILLIN AND TAZOBACTAM 200 GRAM(S): 4; .5 INJECTION, POWDER, LYOPHILIZED, FOR SOLUTION INTRAVENOUS at 13:34

## 2018-12-17 RX ADMIN — LINEZOLID 300 MILLIGRAM(S): 600 INJECTION, SOLUTION INTRAVENOUS at 00:38

## 2018-12-17 RX ADMIN — Medication 50 MILLIEQUIVALENT(S): at 02:33

## 2018-12-17 RX ADMIN — CHLORHEXIDINE GLUCONATE 15 MILLILITER(S): 213 SOLUTION TOPICAL at 17:54

## 2018-12-17 RX ADMIN — Medication 50 MILLIEQUIVALENT(S): at 09:03

## 2018-12-17 RX ADMIN — PANTOPRAZOLE SODIUM 40 MILLIGRAM(S): 20 TABLET, DELAYED RELEASE ORAL at 17:55

## 2018-12-17 RX ADMIN — Medication 290 GRAM(S): at 09:02

## 2018-12-17 RX ADMIN — Medication 50 MILLILITER(S): at 07:42

## 2018-12-17 RX ADMIN — PIPERACILLIN AND TAZOBACTAM 100 GRAM(S): 4; .5 INJECTION, POWDER, LYOPHILIZED, FOR SOLUTION INTRAVENOUS at 01:34

## 2018-12-17 RX ADMIN — Medication 50 MILLIEQUIVALENT(S): at 04:48

## 2018-12-17 RX ADMIN — SODIUM CHLORIDE 80 MILLILITER(S): 9 INJECTION, SOLUTION INTRAVENOUS at 00:20

## 2018-12-17 RX ADMIN — Medication 125 MILLILITER(S): at 16:18

## 2018-12-17 RX ADMIN — PANTOPRAZOLE SODIUM 40 MILLIGRAM(S): 20 TABLET, DELAYED RELEASE ORAL at 06:06

## 2018-12-17 RX ADMIN — Medication 50 MILLILITER(S): at 07:23

## 2018-12-17 RX ADMIN — Medication 4.91 MICROGRAM(S)/KG/MIN: at 10:24

## 2018-12-17 RX ADMIN — Medication 50 MILLIEQUIVALENT(S): at 00:51

## 2018-12-17 RX ADMIN — Medication 4.91 MICROGRAM(S)/KG/MIN: at 16:19

## 2018-12-17 RX ADMIN — Medication 128.25 GRAM(S): at 11:51

## 2018-12-17 RX ADMIN — FENTANYL CITRATE 5.24 MICROGRAM(S)/KG/HR: 50 INJECTION INTRAVENOUS at 02:41

## 2018-12-17 RX ADMIN — PIPERACILLIN AND TAZOBACTAM 200 GRAM(S): 4; .5 INJECTION, POWDER, LYOPHILIZED, FOR SOLUTION INTRAVENOUS at 19:02

## 2018-12-17 RX ADMIN — Medication 50 MILLIEQUIVALENT(S): at 04:10

## 2018-12-17 RX ADMIN — Medication 125 MILLILITER(S): at 23:53

## 2018-12-17 RX ADMIN — Medication 2.46 MICROGRAM(S)/KG/MIN: at 19:53

## 2018-12-17 RX ADMIN — Medication 1 APPLICATION(S): at 22:35

## 2018-12-17 RX ADMIN — Medication 64.06 GRAM(S): at 16:08

## 2018-12-17 RX ADMIN — Medication 2: at 11:53

## 2018-12-17 RX ADMIN — PROPOFOL 1.57 MICROGRAM(S)/KG/MIN: 10 INJECTION, EMULSION INTRAVENOUS at 11:50

## 2018-12-17 RX ADMIN — PIPERACILLIN AND TAZOBACTAM 100 GRAM(S): 4; .5 INJECTION, POWDER, LYOPHILIZED, FOR SOLUTION INTRAVENOUS at 07:09

## 2018-12-17 RX ADMIN — LINEZOLID 300 MILLIGRAM(S): 600 INJECTION, SOLUTION INTRAVENOUS at 00:00

## 2018-12-17 RX ADMIN — LINEZOLID 300 MILLIGRAM(S): 600 INJECTION, SOLUTION INTRAVENOUS at 11:52

## 2018-12-17 RX ADMIN — Medication 125 MILLILITER(S): at 09:03

## 2018-12-17 RX ADMIN — Medication 50 MILLIEQUIVALENT(S): at 09:14

## 2018-12-17 RX ADMIN — Medication 50 MILLIEQUIVALENT(S): at 04:49

## 2018-12-17 NOTE — PROGRESS NOTE ADULT - ATTENDING COMMENTS
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 Without transfer for evaluation for transplant patient has minimal chance for survival.

## 2018-12-17 NOTE — CHART NOTE - NSCHARTNOTEFT_GEN_A_CORE
PATIENT STATUS TRANSFER NOTE:    Vitals:  Tm 97.1 (R)   (NSR)  BP SBP 110s/70s, MAP 80 on levophed 15mcg  RR 27  SaO2 100%    Ventilator settings:  FiO2 40%    RR 22  Ti 1  PEEP 5    Lines:  RIJ dialysis catheter triple lumen (placed 12/17/18)  L subclavian triple lumen (placed 12/16/18)  L axillary arterial line (placed 12/17/18)  Peripherals b/l UE and b/l LE    Mo catheter: 0 UOP  NGT: 100cc in 12hrs  CVVHD (started 12/17 ~1pm)    Patient hemodynamically stable for transfer to [facility]. PATIENT STATUS TRANSFER NOTE:    Vitals:  Tm 97.1 (R) off warming blanket; repeat T 95 placed back on warming blanket   (NSR)  BP SBP 110s/70s, MAP 80 on levophed 15mcg  RR 27  SaO2 100%    Ventilator settings:  FiO2 40%    RR 22  Ti 1  PEEP 5    Lines:  RIJ dialysis catheter triple lumen (placed 12/17/18)  L subclavian triple lumen (placed 12/16/18)  L axillary arterial line (placed 12/17/18)  Peripherals b/l UE and b/l LE    Mo catheter: 0 UOP  NGT: 100cc in 12hrs  CVVHD (started 12/17 ~1pm)    Patient hemodynamically stable for transfer to [facility]. PATIENT STATUS TRANSFER NOTE:    Vitals:  Tm 97.1 (R) off warming blanket; repeat T 95 placed back on warming blanket   (sinus)  BP SBP 110s/70s, MAP 80 on levophed 15mcg  RR 27  SaO2 100%    Ventilator settings:  FiO2 40%    RR 22  Ti 1  PEEP 5    Lines:  RIJ dialysis catheter triple lumen (placed 12/17/18)  L subclavian triple lumen (placed 12/16/18)  L axillary arterial line (placed 12/17/18)  Peripherals b/l UE and b/l LE    Mo catheter: 0 UOP  NGT: 100cc in 12hrs  CVVHD (started 12/17 ~1pm)    PHYSICAL EXAM:    Constitutional: elderly, intubated/sedated  Eyes: 4mm pupils b/l reactive to light, no corneals  ENMT: intubated, NG tube in place  Neck: supple, RIJ dialysis catheter and L subclavian triple lumen in place  Respiratory: CTAb/l, no wheezes/rales/rhonchi  Cardiovascular: regular rhythm at rates low 100s, no murmurs/rubs/gallops  Gastrointestinal: soft, mildly distended, normal bowel sounds  Extremities: no edema, some mottling of b/l knees and dusky fingertips  Vascular: cool extremities, DP 2+  Neurological: sedated on fentanyl unable to accurately assess neuro exam  Musculoskeletal: no joint swelling  Skin: cool, dry    MEDICATIONS  (STANDING):  chlorhexidine 0.12% Liquid 15 milliLiter(s) Oral Mucosa every 12 hours  chlorhexidine 2% Cloths 1 Application(s) Topical <User Schedule>  CRRT Treatment    <Continuous>  dextrose 10%. 1000 milliLiter(s) (125 mL/Hr) IV Continuous <Continuous>  dextrose 5%. 1000 milliLiter(s) (50 mL/Hr) IV Continuous <Continuous>  fentaNYL   Infusion. 1 MICROgram(s)/kG/Hr (5.24 mL/Hr) IV Continuous <Continuous>  norepinephrine Infusion 0.05 MICROgram(s)/kG/Min (2.456 mL/Hr) IV Continuous <Continuous>  pantoprazole  Injectable 40 milliGRAM(s) IV Push two times a day  petrolatum Ophthalmic Ointment 1 Application(s) Both EYES three times a day  piperacillin/tazobactam IVPB. 3.375 Gram(s) IV Intermittent every 6 hours  propofol Infusion 5 MICROgram(s)/kG/Min (1.572 mL/Hr) IV Continuous <Continuous>  PureFlow Dialysate RFP-400 (K 2 / Ca 3) 5000 milliLiter(s) (2500 mL/Hr) CRRT <Continuous>  vasopressin Infusion 0.01 Unit(s)/Min (0.6 mL/Hr) IV Continuous <Continuous>      RECENT LABS:                        8.7    9.8   )-----------( 290      ( 17 Dec 2018 17:02 )             29.6     12-17    144  |  92<L>  |  16  ----------------------------<  269<H>  3.3<L>   |  11<L>  |  1.95<H>    Ca    6.9<L>      17 Dec 2018 17:02  Phos  3.6     12-17  Mg     1.2     12-17    TPro  4.2<L>  /  Alb  2.2<L>  /  TBili  2.8<H>  /  DBili  x   /  AST  >7000<H>  /  ALT  5231<H>  /  AlkPhos  127<H>  12-17    PT/INR - ( 17 Dec 2018 17:02 )   PT: 92.4 sec;   INR: 7.68          PTT - ( 17 Dec 2018 17:02 )  PTT:38.0 sec    EKG sinus tachycardia, poor waveform with non-specific ST/T changes.      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 Pt with multiorgan failure requiring CVVHD and troponinemia 2/2 poor clearance. No clear source of sepsis however continuing on Linezolid and Zosyn for broad spectrum coverage; cultures negative thus far. Pressor requirements decreasing, hemodynamically stable for transfer. Plan discussed with Dr. Booker (MICU fellow) and Dr. Davila. PATIENT STATUS TRANSFER NOTE:    Vitals:  Tm 97.1 (R) off warming blanket; repeat T 95 placed back on warming blanket   (sinus)  BP SBP 110s/70s, MAP 80 on levophed 15mcg  RR 27  SaO2 100%    Ventilator settings:  FiO2 40%    RR 22  Ti 1  PEEP 5    Lines:  RIJ dialysis catheter triple lumen (placed 12/17/18)  L subclavian triple lumen (placed 12/16/18)  L axillary arterial line (placed 12/17/18)  Peripherals b/l UE and b/l LE    Mo catheter: 0 UOP  NGT: 100cc in 12hrs  CVVHD (started 12/17 ~1pm)    PHYSICAL EXAM:    Constitutional: elderly, intubated/sedated  Eyes: 4mm pupils b/l reactive to light, no corneals  ENMT: intubated, NG tube in place  Neck: supple, RIJ dialysis catheter and L subclavian triple lumen in place  Respiratory: CTAb/l, no wheezes/rales/rhonchi  Cardiovascular: regular rhythm at rates low 100s, no murmurs/rubs/gallops  Gastrointestinal: soft, mildly distended, normal bowel sounds  Extremities: no edema, some mottling of b/l knees and dusky fingertips  Vascular: cool extremities, DP 2+  Neurological: sedated on fentanyl unable to accurately assess neuro exam  Musculoskeletal: no joint swelling  Skin: cool, dry    MEDICATIONS  (STANDING):  chlorhexidine 0.12% Liquid 15 milliLiter(s) Oral Mucosa every 12 hours  chlorhexidine 2% Cloths 1 Application(s) Topical <User Schedule>  CRRT Treatment    <Continuous>  dextrose 10%. 1000 milliLiter(s) (125 mL/Hr) IV Continuous <Continuous>  dextrose 5%. 1000 milliLiter(s) (50 mL/Hr) IV Continuous <Continuous>  fentaNYL   Infusion. 1 MICROgram(s)/kG/Hr (5.24 mL/Hr) IV Continuous <Continuous>  norepinephrine Infusion 0.05 MICROgram(s)/kG/Min (2.456 mL/Hr) IV Continuous <Continuous>  pantoprazole  Injectable 40 milliGRAM(s) IV Push two times a day  petrolatum Ophthalmic Ointment 1 Application(s) Both EYES three times a day  piperacillin/tazobactam IVPB. 3.375 Gram(s) IV Intermittent every 6 hours  propofol Infusion 5 MICROgram(s)/kG/Min (1.572 mL/Hr) IV Continuous <Continuous>  PureFlow Dialysate RFP-400 (K 2 / Ca 3) 5000 milliLiter(s) (2500 mL/Hr) CRRT <Continuous>  vasopressin Infusion 0.01 Unit(s)/Min (0.6 mL/Hr) IV Continuous <Continuous>      RECENT LABS:                        8.7    9.8   )-----------( 290      ( 17 Dec 2018 17:02 )             29.6     12-17    144  |  92<L>  |  16  ----------------------------<  269<H>  3.3<L>   |  11<L>  |  1.95<H>    Ca    6.9<L>      17 Dec 2018 17:02  Phos  3.6     12-17  Mg     1.2     12-17    TPro  4.2<L>  /  Alb  2.2<L>  /  TBili  2.8<H>  /  DBili  x   /  AST  >7000<H>  /  ALT  5231<H>  /  AlkPhos  127<H>  12-17    PT/INR - ( 17 Dec 2018 17:02 )   PT: 92.4 sec;   INR: 7.68          PTT - ( 17 Dec 2018 17:02 )  PTT:38.0 sec    EKG sinus tachycardia, poor waveform with non-specific ST/T changes.      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 Pt with multiorgan failure requiring CVVHD and troponinemia 2/2 poor clearance. No clear source of sepsis however continuing on Linezolid and Zosyn for broad spectrum coverage; cultures negative thus far. Pressor requirements decreasing, hemodynamically stable for transfer. Plan discussed with Dr. Booker (MICU fellow) and Dr. Davila.    Attempted to call mali Pickett, went to voicemail.

## 2018-12-17 NOTE — PROVIDER CONTACT NOTE (CRITICAL VALUE NOTIFICATION) - ASSESSMENT
patient remains critically ill, with levophed requirements increasing, to unstable for dialysis, CVVHD to begin.

## 2018-12-17 NOTE — DISCHARGE NOTE ADULT - PATIENT PORTAL LINK FT
You can access the [facility] Patient Portal, offered by [facility], by registering with the following website: [email or web address]

## 2018-12-17 NOTE — PROGRESS NOTE ADULT - SUBJECTIVE AND OBJECTIVE BOX
INTERVAL HPI/OVERNIGHT EVENTS:  L subclavian central line placed. HD Shiley placed with plans for dialysis. Levophed requirements increased.   Hypoglycemic intermittently     OVERNIGHT: No overnight events.  SUBJECTIVE: Patient seen and examined at bedside.     ROS:  unable to obtain      VITAL SIGNS:  ICU Vital Signs Last 24 Hrs  T(C): 36.8 (17 Dec 2018 05:40), Max: 37 (16 Dec 2018 23:08)  T(F): 98.3 (17 Dec 2018 05:40), Max: 98.6 (16 Dec 2018 23:08)  HR: 112 (17 Dec 2018 07:00) (90 - 126)  BP: 90/51 (17 Dec 2018 07:00) (81/56 - 138/61)  BP(mean): 67 (17 Dec 2018 07:00) (56 - 111)  ABP: --  ABP(mean): --  RR: 29 (17 Dec 2018 07:00) (25 - 42)  SpO2: 100% (17 Dec 2018 06:00) (95% - 100%)    Mode: AC/ CMV (Assist Control/ Continuous Mandatory Ventilation), RR (machine): 24, TV (machine): 420, FiO2: 50, PEEP: 5, ITime: 1, MAP: 12, PIP: 19    12-16 @ 07:01  -  12-17 @ 07:00  --------------------------------------------------------  IN: 4838.4 mL / OUT: 172 mL / NET: 4666.4 mL      CAPILLARY BLOOD GLUCOSE  11 (16 Dec 2018 17:01)      POCT Blood Glucose.: 219 mg/dL (17 Dec 2018 07:34)      PHYSICAL EXAM:    General: intubated, sedated   HEENT: NCAT, PERRL, clear conjunctiva, no scleral icterus  Neck: supple, no JVD  Respiratory: CTA b/l, no wheezing, rhonchi, rales  Cardiovascular: RRR, normal S1S2, no M/R/G  Abdomen: soft, NT/ND, bowel sounds in all four quadrants, no palpable masses  Extremities: WWP, no clubbing, cyanosis, or edema  Neuro: intubated, sedated     MEDICATIONS:  MEDICATIONS  (STANDING):  acetylcysteine IVPB 8 Gram(s) IV Intermittent once  chlorhexidine 2% Cloths 1 Application(s) Topical <User Schedule>  dextrose 5% 1000 milliLiter(s) (150 mL/Hr) IV Continuous <Continuous>  dextrose 5%. 1000 milliLiter(s) (50 mL/Hr) IV Continuous <Continuous>  dextrose 50% Injectable 25 Gram(s) IV Push once  dextrose 50% Injectable 25 Gram(s) IV Push once  dextrose 50% Injectable 12.5 Gram(s) IV Push once  fentaNYL   Infusion. 1 MICROgram(s)/kG/Hr (5.24 mL/Hr) IV Continuous <Continuous>  insulin lispro (HumaLOG) corrective regimen sliding scale   SubCutaneous Before meals and at bedtime  linezolid  IVPB 600 milliGRAM(s) IV Intermittent every 12 hours  norepinephrine Infusion 0.05 MICROgram(s)/kG/Min (4.913 mL/Hr) IV Continuous <Continuous>  pantoprazole  Injectable 40 milliGRAM(s) IV Push two times a day  piperacillin/tazobactam IVPB. 3.375 Gram(s) IV Intermittent every 6 hours  propofol Infusion 5 MICROgram(s)/kG/Min (1.572 mL/Hr) IV Continuous <Continuous>    MEDICATIONS  (PRN):  dextrose 40% Gel 15 Gram(s) Oral once PRN Blood Glucose LESS THAN 70 milliGRAM(s)/deciliter  glucagon  Injectable 1 milliGRAM(s) IntraMuscular once PRN Glucose LESS THAN 70 milligrams/deciliter      ALLERGIES:  Allergies    vancomycin (Unknown)    Intolerances        LABS:                        8.4    10.4  )-----------( 323      ( 17 Dec 2018 06:09 )             28.0     12-17    150<H>  |  94<L>  |  x   ----------------------------<  46<L>  4.3   |  13<L>  |  2.37<H>    Ca    7.5<L>      17 Dec 2018 06:09  Phos  4.6     12-17  Mg     1.3     12-17    TPro  4.7<L>  /  Alb  2.6<L>  /  TBili  2.7<H>  /  DBili  x   /  AST  >7000<H>  /  ALT  4161<H>  /  AlkPhos  137<H>  12-17    PT/INR - ( 17 Dec 2018 07:30 )   PT: 77.3 sec;   INR: 6.46          PTT - ( 17 Dec 2018 07:30 )  PTT:36.8 sec  Urinalysis Basic - ( 16 Dec 2018 19:12 )    Color: Yellow / Appearance: SL Cloudy / SG: >=1.030 / pH: x  Gluc: x / Ketone: NEGATIVE  / Bili: Small / Urobili: 0.2 E.U./dL   Blood: x / Protein: 30 mg/dL / Nitrite: NEGATIVE   Leuk Esterase: NEGATIVE / RBC: < 5 /HPF / WBC < 5 /HPF   Sq Epi: x / Non Sq Epi: 0-5 /HPF / Bacteria: Present /HPF        RADIOLOGY & ADDITIONAL TESTS: Reviewed. INTERVAL HPI/OVERNIGHT EVENTS:  L subclavian central line placed. HD Shiley placed with plans for dialysis. Levophed requirements increased.   Hypoglycemic intermittently. Started on D5, given 2 amp dextrose.   UOP downtrending.     SUBJECTIVE: Patient seen and examined at bedside. Pt intubated/Sedated.     ROS:  unable to obtain      VITAL SIGNS:  ICU Vital Signs Last 24 Hrs  T(C): 36.8 (17 Dec 2018 05:40), Max: 37 (16 Dec 2018 23:08)  T(F): 98.3 (17 Dec 2018 05:40), Max: 98.6 (16 Dec 2018 23:08)  HR: 112 (17 Dec 2018 07:00) (90 - 126)  BP: 90/51 (17 Dec 2018 07:00) (81/56 - 138/61)  BP(mean): 67 (17 Dec 2018 07:00) (56 - 111)  ABP: --  ABP(mean): --  RR: 29 (17 Dec 2018 07:00) (25 - 42)  SpO2: 100% (17 Dec 2018 06:00) (95% - 100%)    Mode: AC/ CMV (Assist Control/ Continuous Mandatory Ventilation), RR (machine): 24, TV (machine): 420, FiO2: 50, PEEP: 5, ITime: 1, MAP: 12, PIP: 19    12-16 @ 07:01  -  12-17 @ 07:00  --------------------------------------------------------  IN: 4838.4 mL / OUT: 172 mL / NET: 4666.4 mL      CAPILLARY BLOOD GLUCOSE  11 (16 Dec 2018 17:01)      POCT Blood Glucose.: 219 mg/dL (17 Dec 2018 07:34)      PHYSICAL EXAM:    General: intubated, sedated, elderly appearing   HEENT: NCAT, PERRL, clear conjunctiva, no scleral icterus  Neck: supple, no JVD   Chest: L subclavian in place, c/d/i  Respiratory: CTA b/l, no wheezing, rhonchi, rales  Cardiovascular: tachycardic, regular rhythm, normal S1S2, no M/R/G  Abdomen: soft, bowel sounds in all four quadrants, no palpable masses  : Mo in place  with minimal output   Extremities: WWP, no clubbing, cyanosis, or edema,   Neuro: intubated, sedated     MEDICATIONS:  MEDICATIONS  (STANDING):  acetylcysteine IVPB 8 Gram(s) IV Intermittent once  chlorhexidine 2% Cloths 1 Application(s) Topical <User Schedule>  dextrose 5% 1000 milliLiter(s) (150 mL/Hr) IV Continuous <Continuous>  dextrose 5%. 1000 milliLiter(s) (50 mL/Hr) IV Continuous <Continuous>  dextrose 50% Injectable 25 Gram(s) IV Push once  dextrose 50% Injectable 25 Gram(s) IV Push once  dextrose 50% Injectable 12.5 Gram(s) IV Push once  fentaNYL   Infusion. 1 MICROgram(s)/kG/Hr (5.24 mL/Hr) IV Continuous <Continuous>  insulin lispro (HumaLOG) corrective regimen sliding scale   SubCutaneous Before meals and at bedtime  linezolid  IVPB 600 milliGRAM(s) IV Intermittent every 12 hours  norepinephrine Infusion 0.05 MICROgram(s)/kG/Min (4.913 mL/Hr) IV Continuous <Continuous>  pantoprazole  Injectable 40 milliGRAM(s) IV Push two times a day  piperacillin/tazobactam IVPB. 3.375 Gram(s) IV Intermittent every 6 hours  propofol Infusion 5 MICROgram(s)/kG/Min (1.572 mL/Hr) IV Continuous <Continuous>    MEDICATIONS  (PRN):  dextrose 40% Gel 15 Gram(s) Oral once PRN Blood Glucose LESS THAN 70 milliGRAM(s)/deciliter  glucagon  Injectable 1 milliGRAM(s) IntraMuscular once PRN Glucose LESS THAN 70 milligrams/deciliter      ALLERGIES:  Allergies    vancomycin (Unknown)    Intolerances        LABS:                        8.4    10.4  )-----------( 323      ( 17 Dec 2018 06:09 )             28.0     12-17    150<H>  |  94<L>  |  x   ----------------------------<  46<L>  4.3   |  13<L>  |  2.37<H>    Ca    7.5<L>      17 Dec 2018 06:09  Phos  4.6     12-17  Mg     1.3     12-17    TPro  4.7<L>  /  Alb  2.6<L>  /  TBili  2.7<H>  /  DBili  x   /  AST  >7000<H>  /  ALT  4161<H>  /  AlkPhos  137<H>  12-17    PT/INR - ( 17 Dec 2018 07:30 )   PT: 77.3 sec;   INR: 6.46          PTT - ( 17 Dec 2018 07:30 )  PTT:36.8 sec  Urinalysis Basic - ( 16 Dec 2018 19:12 )    Color: Yellow / Appearance: SL Cloudy / SG: >=1.030 / pH: x  Gluc: x / Ketone: NEGATIVE  / Bili: Small / Urobili: 0.2 E.U./dL   Blood: x / Protein: 30 mg/dL / Nitrite: NEGATIVE   Leuk Esterase: NEGATIVE / RBC: < 5 /HPF / WBC < 5 /HPF   Sq Epi: x / Non Sq Epi: 0-5 /HPF / Bacteria: Present /HPF        RADIOLOGY & ADDITIONAL TESTS: Reviewed. INTERVAL HPI/OVERNIGHT EVENTS:  L subclavian central line placed. HD Shiley placed with plans for dialysis. Levophed requirements increased.   Hypoglycemic intermittently. Started on D5, given 2 amp dextrose.   UOP downtrending.     SUBJECTIVE: Patient seen and examined at bedside. Pt intubated/Sedated.     ROS:  unable to obtain      VITAL SIGNS:  ICU Vital Signs Last 24 Hrs  T(C): 36.8 (17 Dec 2018 05:40), Max: 37 (16 Dec 2018 23:08)  T(F): 98.3 (17 Dec 2018 05:40), Max: 98.6 (16 Dec 2018 23:08)  HR: 112 (17 Dec 2018 07:00) (90 - 126)  BP: 90/51 (17 Dec 2018 07:00) (81/56 - 138/61)  BP(mean): 67 (17 Dec 2018 07:00) (56 - 111)  ABP: --  ABP(mean): --  RR: 29 (17 Dec 2018 07:00) (25 - 42)  SpO2: 100% (17 Dec 2018 06:00) (95% - 100%)    Mode: AC/ CMV (Assist Control/ Continuous Mandatory Ventilation), RR (machine): 24, TV (machine): 420, FiO2: 50, PEEP: 5, ITime: 1, MAP: 12, PIP: 19    12-16 @ 07:01  -  12-17 @ 07:00  --------------------------------------------------------  IN: 4838.4 mL / OUT: 172 mL / NET: 4666.4 mL      CAPILLARY BLOOD GLUCOSE  11 (16 Dec 2018 17:01)      POCT Blood Glucose.: 219 mg/dL (17 Dec 2018 07:34)      PHYSICAL EXAM:    General: intubated, sedated, elderly appearing   HEENT: NCAT, PERRL, clear conjunctiva, no scleral icterus  Neck: supple, no JVD ,R HD cath in place  Chest: L subclavian in place, c/d/i  Respiratory: CTA b/l, no wheezing, rhonchi, rales  Cardiovascular: tachycardic, regular rhythm, normal S1S2, no M/R/G  Abdomen: soft, bowel sounds in all four quadrants, no palpable masses  : Mo in place  with minimal output   Extremities: WWP, no clubbing, cyanosis, or edema,   Neuro: intubated, sedated, pupils sluggish.     MEDICATIONS:  MEDICATIONS  (STANDING):  acetylcysteine IVPB 8 Gram(s) IV Intermittent once  chlorhexidine 2% Cloths 1 Application(s) Topical <User Schedule>  dextrose 5% 1000 milliLiter(s) (150 mL/Hr) IV Continuous <Continuous>  dextrose 5%. 1000 milliLiter(s) (50 mL/Hr) IV Continuous <Continuous>  dextrose 50% Injectable 25 Gram(s) IV Push once  dextrose 50% Injectable 25 Gram(s) IV Push once  dextrose 50% Injectable 12.5 Gram(s) IV Push once  fentaNYL   Infusion. 1 MICROgram(s)/kG/Hr (5.24 mL/Hr) IV Continuous <Continuous>  insulin lispro (HumaLOG) corrective regimen sliding scale   SubCutaneous Before meals and at bedtime  linezolid  IVPB 600 milliGRAM(s) IV Intermittent every 12 hours  norepinephrine Infusion 0.05 MICROgram(s)/kG/Min (4.913 mL/Hr) IV Continuous <Continuous>  pantoprazole  Injectable 40 milliGRAM(s) IV Push two times a day  piperacillin/tazobactam IVPB. 3.375 Gram(s) IV Intermittent every 6 hours  propofol Infusion 5 MICROgram(s)/kG/Min (1.572 mL/Hr) IV Continuous <Continuous>    MEDICATIONS  (PRN):  dextrose 40% Gel 15 Gram(s) Oral once PRN Blood Glucose LESS THAN 70 milliGRAM(s)/deciliter  glucagon  Injectable 1 milliGRAM(s) IntraMuscular once PRN Glucose LESS THAN 70 milligrams/deciliter      ALLERGIES:  Allergies    vancomycin (Unknown)    Intolerances        LABS:                        8.4    10.4  )-----------( 323      ( 17 Dec 2018 06:09 )             28.0     12-17    150<H>  |  94<L>  |  x   ----------------------------<  46<L>  4.3   |  13<L>  |  2.37<H>    Ca    7.5<L>      17 Dec 2018 06:09  Phos  4.6     12-17  Mg     1.3     12-17    TPro  4.7<L>  /  Alb  2.6<L>  /  TBili  2.7<H>  /  DBili  x   /  AST  >7000<H>  /  ALT  4161<H>  /  AlkPhos  137<H>  12-17    PT/INR - ( 17 Dec 2018 07:30 )   PT: 77.3 sec;   INR: 6.46          PTT - ( 17 Dec 2018 07:30 )  PTT:36.8 sec  Urinalysis Basic - ( 16 Dec 2018 19:12 )    Color: Yellow / Appearance: SL Cloudy / SG: >=1.030 / pH: x  Gluc: x / Ketone: NEGATIVE  / Bili: Small / Urobili: 0.2 E.U./dL   Blood: x / Protein: 30 mg/dL / Nitrite: NEGATIVE   Leuk Esterase: NEGATIVE / RBC: < 5 /HPF / WBC < 5 /HPF   Sq Epi: x / Non Sq Epi: 0-5 /HPF / Bacteria: Present /HPF        RADIOLOGY & ADDITIONAL TESTS: Reviewed.

## 2018-12-17 NOTE — CONSULT NOTE ADULT - SUBJECTIVE AND OBJECTIVE BOX
Patient is a 73 y o  female with PMH of alcoholism, who was brought to ED after being found unresponsive.   Nephrology consult is placed in regards to acidosis and brigid.   Baseline creatinine is unknown.   on admission, Bicarb @7, cr @ 2.14, corrected AGAP >45, lactate > 21, serum osm @ 318 with osmolar gap @ 11.5.   salicylates @ 1.6, tylenol level @ 45.   Unclear if patient is on metformin.   Since admission, patient did receive several liters of IVF, in addition to amps of bicarbonate.   latest ABG 7.44/17/17.   pt became anuric around 9 pm.  levophed requirement significantly increased  fluid balance +4 L     PAST MEDICAL & SURGICAL HISTORY:  HLD (hyperlipidemia)  HTN (hypertension)  No significant past surgical history      Allergies  vancomycin (Unknown)  Intolerances      FAMILY HISTORY: no History      SOCIAL HISTORY: unable to obtain such information     MEDICATIONS  (STANDING):  acetylcysteine IVPB 2.6 Gram(s) IV Intermittent once  acetylcysteine IVPB 5 Gram(s) IV Intermittent once  chlorhexidine 0.12% Liquid 15 milliLiter(s) Oral Mucosa every 12 hours  chlorhexidine 2% Cloths 1 Application(s) Topical <User Schedule>  CRRT Treatment    <Continuous>  dextrose 10%. 1000 milliLiter(s) (125 mL/Hr) IV Continuous <Continuous>  dextrose 5%. 1000 milliLiter(s) (50 mL/Hr) IV Continuous <Continuous>  dextrose 50% Injectable 25 Gram(s) IV Push once  dextrose 50% Injectable 25 Gram(s) IV Push once  dextrose 50% Injectable 12.5 Gram(s) IV Push once  fentaNYL   Infusion. 1 MICROgram(s)/kG/Hr (5.24 mL/Hr) IV Continuous <Continuous>  insulin lispro (HumaLOG) corrective regimen sliding scale   SubCutaneous Before meals and at bedtime  linezolid  IVPB 600 milliGRAM(s) IV Intermittent every 12 hours  norepinephrine Infusion 0.05 MICROgram(s)/kG/Min (4.913 mL/Hr) IV Continuous <Continuous>  pantoprazole  Injectable 40 milliGRAM(s) IV Push two times a day  piperacillin/tazobactam IVPB. 2.25 Gram(s) IV Intermittent every 6 hours  propofol Infusion 5 MICROgram(s)/kG/Min (1.572 mL/Hr) IV Continuous <Continuous>  PureFlow Dialysate RFP-400 (K 2 / Ca 3) 5000 milliLiter(s) (2500 mL/Hr) CRRT <Continuous>    MEDICATIONS  (PRN):  dextrose 40% Gel 15 Gram(s) Oral once PRN Blood Glucose LESS THAN 70 milliGRAM(s)/deciliter  glucagon  Injectable 1 milliGRAM(s) IntraMuscular once PRN Glucose LESS THAN 70 milligrams/deciliter      REVIEW OF SYSTEMS:  not applicable        PHYSICAL EXAM:  GENERAL: intubated, sedated  HEAD:  Atraumatic, Normocephalic,   EYES: Bilateral conjunctiva and sclera normal,  Oral cavity: + ET in place   NECK: Neck supple, No JVD  CHEST/LUNG: Clear to auscultation bilaterally; No wheeze, no rales, no crepitations  HEART: Regular rate and rhythm. RACHEAL II/VI at LPSB, No gallop, no rub   ABDOMEN: Soft, Nontender, BS+nt, No flank tenderness.   EXTREMITIES: No edema  Neurology: sedated, intubated  SKIN: No rashes or lesions      CAPILLARY BLOOD GLUCOSE  11 (16 Dec 2018 17:01)      POCT Blood Glucose.: 178 mg/dL (17 Dec 2018 11:23)  POCT Blood Glucose.: 162 mg/dL (17 Dec 2018 09:01)  POCT Blood Glucose.: 219 mg/dL (17 Dec 2018 07:34)  POCT Blood Glucose.: 22 mg/dL (17 Dec 2018 07:15)  POCT Blood Glucose.: 24 mg/dL (17 Dec 2018 07:12)  POCT Blood Glucose.: 82 mg/dL (17 Dec 2018 00:16)  POCT Blood Glucose.: 182 mg/dL (16 Dec 2018 22:26)  POCT Blood Glucose.: 64 mg/dL (16 Dec 2018 22:06)  POCT Blood Glucose.: 134 mg/dL (16 Dec 2018 19:37)  POCT Blood Glucose.: 333 mg/dL (16 Dec 2018 16:49)  POCT Blood Glucose.: 97 mg/dL (16 Dec 2018 16:19)  POCT Blood Glucose.: 432 mg/dL (16 Dec 2018 16:16)  POCT Blood Glucose.: <10 mg/dL (16 Dec 2018 16:11)  POCT Blood Glucose.: 11 mg/dL (16 Dec 2018 16:09)      I&O's Summary    16 Dec 2018 07:01  -  17 Dec 2018 07:00  --------------------------------------------------------  IN: 4838.4 mL / OUT: 172 mL / NET: 4666.4 mL    17 Dec 2018 07:01  -  17 Dec 2018 11:47  --------------------------------------------------------  IN: 260 mL / OUT: 400 mL / NET: -140 mL          LABS:                                                   12-17-18 @ 08:46    150<H>  |  93<L>  |  20  ----------------------------<  145<H>  3.5   |  10<LL>  |  2.45<H>                                                 12-17-18 @ 06:09    150<H>  |  94<L>  |  20  ----------------------------<  46<L>  4.3   |  13<L>  |  2.37<H>                                                 12-17-18 @ 02:57    145  |  93<L>  |  19  ----------------------------<  89  4.1   |  7<LL>  |  2.13<H>                                                 12-16-18 @ 23:16    149<H>  |  95<L>  |  20  ----------------------------<  138<H>  3.3<L>   |  7<LL>  |  1.99<H>                                                 12-16-18 @ 19:30    145  |  92<L>  |  21  ----------------------------<  144<H>  3.7   |  6<LL>  |  1.97<H>                                                 12-16-18 @ 16:45    142  |  86<L>  |  23  ----------------------------<  364<H>  4.3   |  7<LL>  |  2.14<H>    Ca    7.3<L>      17 Dec 2018 08:46  Ca    7.5<L>      17 Dec 2018 06:09  Ca    7.7<L>      17 Dec 2018 02:57  Ca    7.7<L>      16 Dec 2018 23:16  Ca    8.5      16 Dec 2018 19:30  Ca    9.0      16 Dec 2018 16:45  Phos  4.6     12-17  Phos  9.8     12-16  Mg     1.3     12-17  Mg     1.8     12-16    TPro  4.5<L>  /  Alb  2.5<L>  /  TBili  2.9<H>  /  DBili  x   /  AST  >7000<H>  /  ALT  4251<H>  /  AlkPhos  126<H>  12-17  TPro  4.7<L>  /  Alb  2.6<L>  /  TBili  2.7<H>  /  DBili  x   /  AST  >7000<H>  /  ALT  4161<H>  /  AlkPhos  137<H>  12-17  TPro  4.9<L>  /  Alb  2.9<L>  /  TBili  2.9<H>  /  DBili  x   /  AST  >7000<H>  /  ALT  4034<H>  /  AlkPhos  120  12-17  TPro  5.2<L>  /  Alb  3.2<L>  /  TBili  2.7<H>  /  DBili  x   /  AST  >7000<H>  /  ALT  3529<H>  /  AlkPhos  113  12-16  TPro  6.2  /  Alb  3.5  /  TBili  2.6<H>  /  DBili  x   /  AST  >7000  /  ALT  3878<H>  /  AlkPhos  113  12-16  TPro  5.8<L>  /  Alb  3.4  /  TBili  2.4<H>  /  DBili  x   /  AST  6740<H>  /  ALT  3319<H>  /  AlkPhos  103  12-16                          8.4    10.4  )-----------( 323      ( 17 Dec 2018 06:09 )             28.0     CBC Full  -  ( 17 Dec 2018 06:09 )  WBC Count : 10.4 K/uL  Hemoglobin : 8.4 g/dL  Hematocrit : 28.0 %  Platelet Count - Automated : 323 K/uL  Mean Cell Volume : 96.9 fL      CBC Full  -  ( 17 Dec 2018 01:05 )  WBC Count : 12.1 K/uL  Hemoglobin : 8.1 g/dL  Hematocrit : 27.4 %  Platelet Count - Automated : 315 K/uL  Mean Cell Volume : 100.4 fL      CBC Full  -  ( 16 Dec 2018 19:30 )  WBC Count : 23.5 K/uL  Hemoglobin : 9.3 g/dL  Hematocrit : 31.9 %  Platelet Count - Automated : 412 K/uL  Mean Cell Volume : 100.9 fL      CBC Full  -  ( 16 Dec 2018 16:45 )  WBC Count : 23.3 K/uL  Hemoglobin : 9.2 g/dL  Hematocrit : 31.9 %  Platelet Count - Automated : 406 K/uL  Mean Cell Volume : 100.9 fL        PT/INR - ( 17 Dec 2018 07:30 )   PT: 77.3 sec;   INR: 6.46          PTT - ( 17 Dec 2018 07:30 )  PTT:36.8 sec  CARDIAC MARKERS ( 17 Dec 2018 08:46 )  x     / 0.78 ng/mL / 4565 U/L / x     / 105.9 ng/mL  CARDIAC MARKERS ( 17 Dec 2018 02:57 )  x     / 0.51 ng/mL / 4402 U/L / x     / 108.3 ng/mL  CARDIAC MARKERS ( 16 Dec 2018 23:16 )  x     / 0.31 ng/mL / 3227 U/L / x     / x      CARDIAC MARKERS ( 16 Dec 2018 19:30 )  x     / 0.12 ng/mL / 2334 U/L / x     / 61.5 ng/mL  CARDIAC MARKERS ( 16 Dec 2018 16:45 )  x     / 0.11 ng/mL / 904 U/L / x     / 30.8 ng/mL      Urinalysis Basic - ( 16 Dec 2018 19:12 )    Color: Yellow / Appearance: SL Cloudy / SG: >=1.030 / pH: x  Gluc: x / Ketone: NEGATIVE  / Bili: Small / Urobili: 0.2 E.U./dL   Blood: x / Protein: 30 mg/dL / Nitrite: NEGATIVE   Leuk Esterase: NEGATIVE / RBC: < 5 /HPF / WBC < 5 /HPF   Sq Epi: x / Non Sq Epi: 0-5 /HPF / Bacteria: Present /HPF        RADIOLOGY & ADDITIONAL TESTS:    EKG/Telemetry: Reviewed Patient is a 73 y o  female with PMH of alcoholism, who was brought to ED after being found unresponsive.   Nephrology consult is placed in regards to acidosis and brigid.   Baseline creatinine is unknown.   on admission, Bicarb @7, cr @ 2.14, corrected AGAP >45, lactate > 21, serum osm @ 318 with osmolar gap @ 11.5.   salicylates @ 1.6, tylenol level @ 45.   Unclear if patient is on metformin.   Since admission, patient did receive several liters of IVF, in addition to amps of bicarbonate.   latest ABG 7.44/17/17.   pt became anuric around 9 pm.  levophed requirement significantly increased  fluid balance +4 L     PAST MEDICAL & SURGICAL HISTORY:  HLD (hyperlipidemia)  HTN (hypertension)  No significant past surgical history      Allergies  vancomycin (Unknown)  Intolerances      FAMILY HISTORY: no History      SOCIAL HISTORY: unable to obtain such information     MEDICATIONS  (STANDING):  acetylcysteine IVPB 2.6 Gram(s) IV Intermittent once  acetylcysteine IVPB 5 Gram(s) IV Intermittent once  chlorhexidine 0.12% Liquid 15 milliLiter(s) Oral Mucosa every 12 hours  chlorhexidine 2% Cloths 1 Application(s) Topical <User Schedule>  CRRT Treatment    <Continuous>  dextrose 10%. 1000 milliLiter(s) (125 mL/Hr) IV Continuous <Continuous>  dextrose 5%. 1000 milliLiter(s) (50 mL/Hr) IV Continuous <Continuous>  dextrose 50% Injectable 25 Gram(s) IV Push once  dextrose 50% Injectable 25 Gram(s) IV Push once  dextrose 50% Injectable 12.5 Gram(s) IV Push once  fentaNYL   Infusion. 1 MICROgram(s)/kG/Hr (5.24 mL/Hr) IV Continuous <Continuous>  insulin lispro (HumaLOG) corrective regimen sliding scale   SubCutaneous Before meals and at bedtime  linezolid  IVPB 600 milliGRAM(s) IV Intermittent every 12 hours  norepinephrine Infusion 0.05 MICROgram(s)/kG/Min (4.913 mL/Hr) IV Continuous <Continuous>  pantoprazole  Injectable 40 milliGRAM(s) IV Push two times a day  piperacillin/tazobactam IVPB. 2.25 Gram(s) IV Intermittent every 6 hours  propofol Infusion 5 MICROgram(s)/kG/Min (1.572 mL/Hr) IV Continuous <Continuous>  PureFlow Dialysate RFP-400 (K 2 / Ca 3) 5000 milliLiter(s) (2500 mL/Hr) CRRT <Continuous>    MEDICATIONS  (PRN):  dextrose 40% Gel 15 Gram(s) Oral once PRN Blood Glucose LESS THAN 70 milliGRAM(s)/deciliter  glucagon  Injectable 1 milliGRAM(s) IntraMuscular once PRN Glucose LESS THAN 70 milligrams/deciliter      REVIEW OF SYSTEMS:  not applicable        PHYSICAL EXAM:  GENERAL: intubated, sedated  HEAD:  Atraumatic, Normocephalic,   EYES: Bilateral conjunctiva and sclera normal,  Oral cavity: + ET in place   NECK: + HD cath located on the R side of neck  CHEST/LUNG: Clear to auscultation bilaterally; No wheeze, no rales, no crepitations  HEART: Regular rate and rhythm. RACHEAL II/VI at LPSB, No gallop, no rub   ABDOMEN: Soft, Nontender, BS+nt, No flank tenderness.   EXTREMITIES: No edema  Neurology: sedated, intubated  SKIN: No rashes or lesions      CAPILLARY BLOOD GLUCOSE  11 (16 Dec 2018 17:01)      POCT Blood Glucose.: 178 mg/dL (17 Dec 2018 11:23)  POCT Blood Glucose.: 162 mg/dL (17 Dec 2018 09:01)  POCT Blood Glucose.: 219 mg/dL (17 Dec 2018 07:34)  POCT Blood Glucose.: 22 mg/dL (17 Dec 2018 07:15)  POCT Blood Glucose.: 24 mg/dL (17 Dec 2018 07:12)  POCT Blood Glucose.: 82 mg/dL (17 Dec 2018 00:16)  POCT Blood Glucose.: 182 mg/dL (16 Dec 2018 22:26)  POCT Blood Glucose.: 64 mg/dL (16 Dec 2018 22:06)  POCT Blood Glucose.: 134 mg/dL (16 Dec 2018 19:37)  POCT Blood Glucose.: 333 mg/dL (16 Dec 2018 16:49)  POCT Blood Glucose.: 97 mg/dL (16 Dec 2018 16:19)  POCT Blood Glucose.: 432 mg/dL (16 Dec 2018 16:16)  POCT Blood Glucose.: <10 mg/dL (16 Dec 2018 16:11)  POCT Blood Glucose.: 11 mg/dL (16 Dec 2018 16:09)      I&O's Summary    16 Dec 2018 07:01  -  17 Dec 2018 07:00  --------------------------------------------------------  IN: 4838.4 mL / OUT: 172 mL / NET: 4666.4 mL    17 Dec 2018 07:01  -  17 Dec 2018 11:47  --------------------------------------------------------  IN: 260 mL / OUT: 400 mL / NET: -140 mL          LABS:                                                   12-17-18 @ 08:46    150<H>  |  93<L>  |  20  ----------------------------<  145<H>  3.5   |  10<LL>  |  2.45<H>                                                 12-17-18 @ 06:09    150<H>  |  94<L>  |  20  ----------------------------<  46<L>  4.3   |  13<L>  |  2.37<H>                                                 12-17-18 @ 02:57    145  |  93<L>  |  19  ----------------------------<  89  4.1   |  7<LL>  |  2.13<H>                                                 12-16-18 @ 23:16    149<H>  |  95<L>  |  20  ----------------------------<  138<H>  3.3<L>   |  7<LL>  |  1.99<H>                                                 12-16-18 @ 19:30    145  |  92<L>  |  21  ----------------------------<  144<H>  3.7   |  6<LL>  |  1.97<H>                                                 12-16-18 @ 16:45    142  |  86<L>  |  23  ----------------------------<  364<H>  4.3   |  7<LL>  |  2.14<H>    Ca    7.3<L>      17 Dec 2018 08:46  Ca    7.5<L>      17 Dec 2018 06:09  Ca    7.7<L>      17 Dec 2018 02:57  Ca    7.7<L>      16 Dec 2018 23:16  Ca    8.5      16 Dec 2018 19:30  Ca    9.0      16 Dec 2018 16:45  Phos  4.6     12-17  Phos  9.8     12-16  Mg     1.3     12-17  Mg     1.8     12-16    TPro  4.5<L>  /  Alb  2.5<L>  /  TBili  2.9<H>  /  DBili  x   /  AST  >7000<H>  /  ALT  4251<H>  /  AlkPhos  126<H>  12-17  TPro  4.7<L>  /  Alb  2.6<L>  /  TBili  2.7<H>  /  DBili  x   /  AST  >7000<H>  /  ALT  4161<H>  /  AlkPhos  137<H>  12-17  TPro  4.9<L>  /  Alb  2.9<L>  /  TBili  2.9<H>  /  DBili  x   /  AST  >7000<H>  /  ALT  4034<H>  /  AlkPhos  120  12-17  TPro  5.2<L>  /  Alb  3.2<L>  /  TBili  2.7<H>  /  DBili  x   /  AST  >7000<H>  /  ALT  3529<H>  /  AlkPhos  113  12-16  TPro  6.2  /  Alb  3.5  /  TBili  2.6<H>  /  DBili  x   /  AST  >7000  /  ALT  3878<H>  /  AlkPhos  113  12-16  TPro  5.8<L>  /  Alb  3.4  /  TBili  2.4<H>  /  DBili  x   /  AST  6740<H>  /  ALT  3319<H>  /  AlkPhos  103  12-16                          8.4    10.4  )-----------( 323      ( 17 Dec 2018 06:09 )             28.0     CBC Full  -  ( 17 Dec 2018 06:09 )  WBC Count : 10.4 K/uL  Hemoglobin : 8.4 g/dL  Hematocrit : 28.0 %  Platelet Count - Automated : 323 K/uL  Mean Cell Volume : 96.9 fL      CBC Full  -  ( 17 Dec 2018 01:05 )  WBC Count : 12.1 K/uL  Hemoglobin : 8.1 g/dL  Hematocrit : 27.4 %  Platelet Count - Automated : 315 K/uL  Mean Cell Volume : 100.4 fL      CBC Full  -  ( 16 Dec 2018 19:30 )  WBC Count : 23.5 K/uL  Hemoglobin : 9.3 g/dL  Hematocrit : 31.9 %  Platelet Count - Automated : 412 K/uL  Mean Cell Volume : 100.9 fL      CBC Full  -  ( 16 Dec 2018 16:45 )  WBC Count : 23.3 K/uL  Hemoglobin : 9.2 g/dL  Hematocrit : 31.9 %  Platelet Count - Automated : 406 K/uL  Mean Cell Volume : 100.9 fL        PT/INR - ( 17 Dec 2018 07:30 )   PT: 77.3 sec;   INR: 6.46          PTT - ( 17 Dec 2018 07:30 )  PTT:36.8 sec  CARDIAC MARKERS ( 17 Dec 2018 08:46 )  x     / 0.78 ng/mL / 4565 U/L / x     / 105.9 ng/mL  CARDIAC MARKERS ( 17 Dec 2018 02:57 )  x     / 0.51 ng/mL / 4402 U/L / x     / 108.3 ng/mL  CARDIAC MARKERS ( 16 Dec 2018 23:16 )  x     / 0.31 ng/mL / 3227 U/L / x     / x      CARDIAC MARKERS ( 16 Dec 2018 19:30 )  x     / 0.12 ng/mL / 2334 U/L / x     / 61.5 ng/mL  CARDIAC MARKERS ( 16 Dec 2018 16:45 )  x     / 0.11 ng/mL / 904 U/L / x     / 30.8 ng/mL      Urinalysis Basic - ( 16 Dec 2018 19:12 )    Color: Yellow / Appearance: SL Cloudy / SG: >=1.030 / pH: x  Gluc: x / Ketone: NEGATIVE  / Bili: Small / Urobili: 0.2 E.U./dL   Blood: x / Protein: 30 mg/dL / Nitrite: NEGATIVE   Leuk Esterase: NEGATIVE / RBC: < 5 /HPF / WBC < 5 /HPF   Sq Epi: x / Non Sq Epi: 0-5 /HPF / Bacteria: Present /HPF        RADIOLOGY & ADDITIONAL TESTS:    EKG/Telemetry: Reviewed Patient is a 73 y o  female with PMH of alcoholism, who was brought to ED after being found unresponsive.   Nephrology consult is placed in regards to acidosis and brigid.   Baseline creatinine is unknown.   on admission, Bicarb @7, cr @ 2.14, corrected AGAP >45, lactate > 21, serum osm @ 318 with osmolar gap @ 11.5.   salicylates @ 1.6, tylenol level @ 45.   Unclear if patient is on metformin.   Since admission, patient did receive several liters of IVF, in addition to amps of bicarbonate.   latest ABG 7.44/17/17.   pt became anuric around 9 pm.  levophed requirement significantly increased  fluid balance +4 L     PAST MEDICAL & SURGICAL HISTORY:  HLD (hyperlipidemia)  HTN (hypertension)  No significant past surgical history      Allergies  vancomycin (Unknown)  Intolerances      FAMILY HISTORY: no History      SOCIAL HISTORY: unable to obtain such information     MEDICATIONS  (STANDING):  acetylcysteine IVPB 2.6 Gram(s) IV Intermittent once  acetylcysteine IVPB 5 Gram(s) IV Intermittent once  chlorhexidine 0.12% Liquid 15 milliLiter(s) Oral Mucosa every 12 hours  chlorhexidine 2% Cloths 1 Application(s) Topical <User Schedule>  CRRT Treatment    <Continuous>  dextrose 10%. 1000 milliLiter(s) (125 mL/Hr) IV Continuous <Continuous>  dextrose 5%. 1000 milliLiter(s) (50 mL/Hr) IV Continuous <Continuous>  dextrose 50% Injectable 25 Gram(s) IV Push once  dextrose 50% Injectable 25 Gram(s) IV Push once  dextrose 50% Injectable 12.5 Gram(s) IV Push once  fentaNYL   Infusion. 1 MICROgram(s)/kG/Hr (5.24 mL/Hr) IV Continuous <Continuous>  insulin lispro (HumaLOG) corrective regimen sliding scale   SubCutaneous Before meals and at bedtime  linezolid  IVPB 600 milliGRAM(s) IV Intermittent every 12 hours  norepinephrine Infusion 0.05 MICROgram(s)/kG/Min (4.913 mL/Hr) IV Continuous <Continuous>  pantoprazole  Injectable 40 milliGRAM(s) IV Push two times a day  piperacillin/tazobactam IVPB. 2.25 Gram(s) IV Intermittent every 6 hours  propofol Infusion 5 MICROgram(s)/kG/Min (1.572 mL/Hr) IV Continuous <Continuous>  PureFlow Dialysate RFP-400 (K 2 / Ca 3) 5000 milliLiter(s) (2500 mL/Hr) CRRT <Continuous>    MEDICATIONS  (PRN):  dextrose 40% Gel 15 Gram(s) Oral once PRN Blood Glucose LESS THAN 70 milliGRAM(s)/deciliter  glucagon  Injectable 1 milliGRAM(s) IntraMuscular once PRN Glucose LESS THAN 70 milligrams/deciliter      REVIEW OF SYSTEMS:  not applicable        PHYSICAL EXAM:  GENERAL: intubated, sedated FIO2 50%  HEAD:  Atraumatic, Normocephalic,   EYES: Bilateral conjunctiva and sclera normal,  Oral cavity: + ET in place   NECK: + HD cath located on the R side of neck  CHEST/LUNG: Clear to auscultation bilaterally; No wheeze, no rales, no crepitations  HEART: Regular rate and rhythm. RACHEAL II/VI at LPSB, No gallop, no rub   ABDOMEN: Soft, Nontender, BS+nt, No flank tenderness.   EXTREMITIES: No edema  Neurology: sedated, intubated  SKIN: No rashes or lesions      CAPILLARY BLOOD GLUCOSE  11 (16 Dec 2018 17:01)      POCT Blood Glucose.: 178 mg/dL (17 Dec 2018 11:23)  POCT Blood Glucose.: 162 mg/dL (17 Dec 2018 09:01)  POCT Blood Glucose.: 219 mg/dL (17 Dec 2018 07:34)  POCT Blood Glucose.: 22 mg/dL (17 Dec 2018 07:15)  POCT Blood Glucose.: 24 mg/dL (17 Dec 2018 07:12)  POCT Blood Glucose.: 82 mg/dL (17 Dec 2018 00:16)  POCT Blood Glucose.: 182 mg/dL (16 Dec 2018 22:26)  POCT Blood Glucose.: 64 mg/dL (16 Dec 2018 22:06)  POCT Blood Glucose.: 134 mg/dL (16 Dec 2018 19:37)  POCT Blood Glucose.: 333 mg/dL (16 Dec 2018 16:49)  POCT Blood Glucose.: 97 mg/dL (16 Dec 2018 16:19)  POCT Blood Glucose.: 432 mg/dL (16 Dec 2018 16:16)  POCT Blood Glucose.: <10 mg/dL (16 Dec 2018 16:11)  POCT Blood Glucose.: 11 mg/dL (16 Dec 2018 16:09)      I&O's Summary    16 Dec 2018 07:01  -  17 Dec 2018 07:00  --------------------------------------------------------  IN: 4838.4 mL / OUT: 172 mL / NET: 4666.4 mL    17 Dec 2018 07:01  -  17 Dec 2018 11:47  --------------------------------------------------------  IN: 260 mL / OUT: 400 mL / NET: -140 mL          LABS:                                                   12-17-18 @ 08:46    150<H>  |  93<L>  |  20  ----------------------------<  145<H>  3.5   |  10<LL>  |  2.45<H>                                                 12-17-18 @ 06:09    150<H>  |  94<L>  |  20  ----------------------------<  46<L>  4.3   |  13<L>  |  2.37<H>                                                 12-17-18 @ 02:57    145  |  93<L>  |  19  ----------------------------<  89  4.1   |  7<LL>  |  2.13<H>                                                 12-16-18 @ 23:16    149<H>  |  95<L>  |  20  ----------------------------<  138<H>  3.3<L>   |  7<LL>  |  1.99<H>                                                 12-16-18 @ 19:30    145  |  92<L>  |  21  ----------------------------<  144<H>  3.7   |  6<LL>  |  1.97<H>                                                 12-16-18 @ 16:45    142  |  86<L>  |  23  ----------------------------<  364<H>  4.3   |  7<LL>  |  2.14<H>    Ca    7.3<L>      17 Dec 2018 08:46  Ca    7.5<L>      17 Dec 2018 06:09  Ca    7.7<L>      17 Dec 2018 02:57  Ca    7.7<L>      16 Dec 2018 23:16  Ca    8.5      16 Dec 2018 19:30  Ca    9.0      16 Dec 2018 16:45  Phos  4.6     12-17  Phos  9.8     12-16  Mg     1.3     12-17  Mg     1.8     12-16    TPro  4.5<L>  /  Alb  2.5<L>  /  TBili  2.9<H>  /  DBili  x   /  AST  >7000<H>  /  ALT  4251<H>  /  AlkPhos  126<H>  12-17  TPro  4.7<L>  /  Alb  2.6<L>  /  TBili  2.7<H>  /  DBili  x   /  AST  >7000<H>  /  ALT  4161<H>  /  AlkPhos  137<H>  12-17  TPro  4.9<L>  /  Alb  2.9<L>  /  TBili  2.9<H>  /  DBili  x   /  AST  >7000<H>  /  ALT  4034<H>  /  AlkPhos  120  12-17  TPro  5.2<L>  /  Alb  3.2<L>  /  TBili  2.7<H>  /  DBili  x   /  AST  >7000<H>  /  ALT  3529<H>  /  AlkPhos  113  12-16  TPro  6.2  /  Alb  3.5  /  TBili  2.6<H>  /  DBili  x   /  AST  >7000  /  ALT  3878<H>  /  AlkPhos  113  12-16  TPro  5.8<L>  /  Alb  3.4  /  TBili  2.4<H>  /  DBili  x   /  AST  6740<H>  /  ALT  3319<H>  /  AlkPhos  103  12-16                          8.4    10.4  )-----------( 323      ( 17 Dec 2018 06:09 )             28.0     CBC Full  -  ( 17 Dec 2018 06:09 )  WBC Count : 10.4 K/uL  Hemoglobin : 8.4 g/dL  Hematocrit : 28.0 %  Platelet Count - Automated : 323 K/uL  Mean Cell Volume : 96.9 fL      CBC Full  -  ( 17 Dec 2018 01:05 )  WBC Count : 12.1 K/uL  Hemoglobin : 8.1 g/dL  Hematocrit : 27.4 %  Platelet Count - Automated : 315 K/uL  Mean Cell Volume : 100.4 fL      CBC Full  -  ( 16 Dec 2018 19:30 )  WBC Count : 23.5 K/uL  Hemoglobin : 9.3 g/dL  Hematocrit : 31.9 %  Platelet Count - Automated : 412 K/uL  Mean Cell Volume : 100.9 fL      CBC Full  -  ( 16 Dec 2018 16:45 )  WBC Count : 23.3 K/uL  Hemoglobin : 9.2 g/dL  Hematocrit : 31.9 %  Platelet Count - Automated : 406 K/uL  Mean Cell Volume : 100.9 fL        PT/INR - ( 17 Dec 2018 07:30 )   PT: 77.3 sec;   INR: 6.46          PTT - ( 17 Dec 2018 07:30 )  PTT:36.8 sec  CARDIAC MARKERS ( 17 Dec 2018 08:46 )  x     / 0.78 ng/mL / 4565 U/L / x     / 105.9 ng/mL  CARDIAC MARKERS ( 17 Dec 2018 02:57 )  x     / 0.51 ng/mL / 4402 U/L / x     / 108.3 ng/mL  CARDIAC MARKERS ( 16 Dec 2018 23:16 )  x     / 0.31 ng/mL / 3227 U/L / x     / x      CARDIAC MARKERS ( 16 Dec 2018 19:30 )  x     / 0.12 ng/mL / 2334 U/L / x     / 61.5 ng/mL  CARDIAC MARKERS ( 16 Dec 2018 16:45 )  x     / 0.11 ng/mL / 904 U/L / x     / 30.8 ng/mL      Urinalysis Basic - ( 16 Dec 2018 19:12 )    Color: Yellow / Appearance: SL Cloudy / SG: >=1.030 / pH: x  Gluc: x / Ketone: NEGATIVE  / Bili: Small / Urobili: 0.2 E.U./dL   Blood: x / Protein: 30 mg/dL / Nitrite: NEGATIVE   Leuk Esterase: NEGATIVE / RBC: < 5 /HPF / WBC < 5 /HPF   Sq Epi: x / Non Sq Epi: 0-5 /HPF / Bacteria: Present /HPF        RADIOLOGY & ADDITIONAL TESTS:    EKG/Telemetry: Reviewed

## 2018-12-17 NOTE — DISCHARGE NOTE ADULT - CARE PLAN
Principal Discharge DX:	Altered mental status, unspecified altered mental status type  Goal:	Prevent recurrence Principal Discharge DX:	Altered mental status, unspecified altered mental status type  Goal:	Prevent recurrence  Assessment and plan of treatment:	You were brought in to the hospital after being found down at your home. In the hospital, we found that you were in multi organ failure. You had several concurrent issues included difficulty breathing, acidity of your blood, low glucose, imbalance of your electrolytes. We had to intubate you to help you breathe, as well as give you dialysis. However you had persistent liver failure, possibly from tylenol, and will need a liver transplant at [facility]. Principal Discharge DX:	Altered mental status, unspecified altered mental status type  Goal:	Prevent recurrence  Assessment and plan of treatment:	You were brought in to the hospital after being found down at your home. In the hospital, we found that you were in multi organ failure. We suspect that this due to too much tylenol which caused liver failure. We had to intubate you to help you breathe, gave you antibiotics, fluid, and medication, and we also started you on dialysis. However you had persistent liver failure and will need a liver transplant at [facility].

## 2018-12-17 NOTE — CONSULT NOTE ADULT - ATTENDING COMMENTS
73 year-old female presents with altered mental status in setting of hypoglycemia, elevated renal and liver enzymes - most probably acute toxic metabolic encephalopathy.  Doubt TIA/CVA.  Plan as per Medicine.
anuric SIDNEY in setting of shock, severe lactic acidosis , CHRISSY   plan CVVHD for clearance as tolerated -- no UF at this point  no report metformin use but check

## 2018-12-17 NOTE — PATIENT PROFILE ADULT - LANGUAGE ASSISTANCE NEEDED
No-Patient/Caregiver offered and refused free interpretation services./Unable to assess patient intubated and sedated.

## 2018-12-17 NOTE — CONSULT NOTE ADULT - PROBLEM SELECTOR RECOMMENDATION 9
etiology: possibly 2nd to tylenol toxicity given persistent hypoglycemia  s/p amps of bicarbonate  lactate persistently high  UDS negative  alcohol < 10  bicarb @ slightly improved  last ABG---> AGA + resp alkalosis  RRT to be started in light of acidosis, preferably CVVHD as pt too unstable  recommend checking metformin level prior to starting RRT  recommend checking lactate, bicarb, abg etiology: possibly 2nd to tylenol toxicity given persistent hypoglycemia and elevated LFTS  s/p amps of bicarbonate  lactate persistently high  UDS negative  alcohol < 10  bicarb @ slightly improved  last ABG---> AGA + resp alkalosis  RRT to be started in light of acidosis, preferably CVVHD as pt too unstable  recommend checking metformin level prior to starting RRT  recommend checking lactate, bicarb, abg etiology: possibly 2nd to tylenol toxicity given persistent hypoglycemia and elevated LFTS  s/p amps of bicarbonate  lactate persistently high: either from toxic metabolite of paracetamol and/or impaired lactate clearance due to reduced hepatic clearance  UDS negative  alcohol < 10  bicarb @ slightly improved  last ABG---> AGA + resp alkalosis  RRT to be started in light of acidosis, preferably CVVHD as pt too unstable  recommend checking metformin level prior to starting RRT  recommend checking lactate, bicarb, abg

## 2018-12-17 NOTE — PROGRESS NOTE ADULT - SUBJECTIVE AND OBJECTIVE BOX
seen on CVVHD at 4cpm   tolerating rx   VSS on levophed-- bp 96/52, pulse 110  cont rx as tolerated-- 'UF only for now  dialysate rate 2.5 L/hour ,  folllow lytes (incl mg and phos) q 4 hours

## 2018-12-17 NOTE — PROVIDER CONTACT NOTE (CRITICAL VALUE NOTIFICATION) - ACTION/TREATMENT ORDERED:
none at this time, lactic acid slightly better than earlier.  patient not going to clear earlier levels due to liver failure.  value not increasing therefore no changes at this time.

## 2018-12-17 NOTE — PROCEDURE NOTE - NSINFORMCONSENT_GEN_A_CORE
This was an emergent procedure.
Benefits, risks, and possible complications of procedure explained to patient/caregiver who verbalized understanding and gave verbal consent.
Benefits, risks, and possible complications of procedure explained to patient/caregiver who verbalized understanding and gave verbal consent.

## 2018-12-17 NOTE — DISCHARGE NOTE ADULT - HOSPITAL COURSE
73F with PMH Alcohol abuse, previous ER visits for fracture/dislocation BIBA for agitation. Per chart review, patient lives at home independently,  she was found down in her condo for an unknown period of time, a neighbor called 911. Upon arrival to the ED, she was non-verbal, but thrashing extremities, without any purposeful movement, but did withdraw from painful stimuli. In the ED, vitals were T: 91  He was found to be hypoglycemia on admission, FSG of 11, was given 2 amp of dextrose with response to 432, downtrended to 97. Pt had elevated WBC fo 23 with neutorphilic predominance, and anemia with Hb 9.2. Coags abnormal with INR elevaetd to 3.83. Pt also acidotic with anion gap fo 49 and bicarb of 7; lacate of 21. Pt also with SIDNEY with creatinine or 2.14, and transaminitis of AST/ALT 6740/3319. Troponin T elevated to .11 with elevated Ck and CKMB as well. In ED, medications given including Zosyn 3.375 g, 3L NS, dextrose 50% 25mL x1 for hypoglycemia.  She was fluid resuscitated, started on broad spectrum antibiotics (zosyn and linezolid). Patient was intubated, started on pressors, transferred to MICU for further management. CTH negative for any acute process. Urine and blood cultures negative. TME likely due to severe hypoglycemia 2/2 liver failure vs. septic shock vs multiple electrolyte derangements (hypokalemic, hypomagnesemic, hyperphosphatemic). Pt received bicarb replacement for acidosis with pH as low as 7.27 Also received several amps of dextrose for persistent hypoglycemia. Cardiac enzymes continued to rise;  EKG showing Q waves in Lead 1 and aVL, incomplete RBBB; troponinemia likely 2/2 shock. Labs significant for elevated acetaminophen level (elevated to 46), and pt started N-acetylcysteine. Pt also started on CVVHD for persistent acidosis. 73F with PMH Alcohol abuse, previous ER visits for fracture/dislocation BIBA for agitation. Per chart review, patient lives at home independently,  she was found down in her condo for an unknown period of time, a neighbor called 911. Upon arrival to the ED, she was non-verbal, but thrashing extremities, without any purposeful movement, but did withdraw from painful stimuli. In the ED, vitals were T: 91  He was found to be hypoglycemia on admission, FSG of 11, was given 2 amp of dextrose with response to 432, downtrended to 97. Pt had elevated WBC fo 23 with neutorphilic predominance, and anemia with Hb 9.2. Coags abnormal with INR elevaetd to 3.83. Pt also acidotic with anion gap fo 49 and bicarb of 7; lacate of 21. Pt also with SIDNEY with creatinine or 2.14, and transaminitis of AST/ALT 6740/3319. Troponin T elevated to .11 with elevated Ck and CKMB as well. In ED, medications given including Zosyn 3.375 g, 3L NS, dextrose 50% 25mL x1 for hypoglycemia.  She was fluid resuscitated, started on broad spectrum antibiotics (zosyn and linezolid). Patient was intubated, started on pressors, transferred to MICU for further management. CTH negative for any acute process. Urine and blood cultures negative. UA, Utox, and alcohol level negative. CT chest/abdomen/pelvis significant for hepatic steatosis, possible aspiration PNA, and multiple cervical compression fractures.  TME likely due to severe hypoglycemia 2/2 liver failure vs. septic shock vs multiple electrolyte derangements (hypokalemic, hypomagnesemic, hyperphosphatemic). Pt received bicarb replacement for acidosis with pH as low as 7.27 Also received several amps of dextrose for persistent hypoglycemia. Cardiac enzymes continued to rise;  EKG showing Q waves in Lead 1 and aVL, incomplete RBBB; troponinemia likely 2/2 shock. Labs significant for elevated acetaminophen level (elevated to 46), and pt started N-acetylcysteine. Pt also started on CVVHD for persistent acidosis. Pt with progressive transaminitis, troponinemia, lactic acidosis, electrolyte derangements, and coagulopathy despite treatment. Pt will likely need a liver transplant at [facility] and is stable for transfer.    Upon discharge:   Vitals:  Tm 97.1 (R) off warming blanket; repeat T 95 placed back on warming blanket   (NSR)  BP SBP 110s/70s, MAP 80 on levophed 15mcg  RR 27  SaO2 100%    Ventilator settings:  FiO2 40%    RR 22  Ti 1  PEEP 5    Lines:  RIJ dialysis catheter triple lumen (placed 12/17/18)  L subclavian triple lumen (placed 12/16/18)  L axillary arterial line (placed 12/17/18)  Peripherals b/l UE and b/l LE    Om catheter: 0 UOP  NGT: 100cc in 12hrs  CVVHD (started 12/17 ~1pm)    Patient hemodynamically stable for transfer to [facility]. Pt is a 73F with PMH alcohol abuse, previous ER visits for fracture/dislocation, BIBA for agitation. Per chart review, patient lives at home independently, and she was found down in her condo for an unknown period of time by a neighbor who called 911. Upon arrival to the ED, she was non-verbal, was thrashing extremities, had no purposeful movement, and did withdraw from painful stimuli. She was hypothermic, hypoglycemic, had an elevated WBC, anemic. Pt was also found to have fulminant liver failure with coagulopathy, anion gap acidosis 2/2 elevated lactate. She also had an SIDNEY and elevated cardiac enzymes. She was started on broad spectrum antibiotics (zosyn and linezolid), was fluid resuscitated with 6L NS and LR, and given dextrose for hypoglycemia. Patient was intubated, started on pressors, transferred to MICU for further management. CTH negative for any acute process. Urine and blood cultures negative. UA, Utox, and alcohol level negative. CT chest/abdomen/pelvis significant for hepatic steatosis, possible aspiration PNA, and multiple cervical compression fractures. TME likely due to fulminant hepatic failure 2/2 acetaminophen toxicity, suspected has pt had slightly elevated osmolar gap of 12 and elevated acetaminophen level to 46. Liver failure complicated by multi-organ failure including severe hypoglycemia, septic shock, multiple electrolyte derangements (hypokalemia, hypomagnesemia, hyperphosphatemia), and elevated cardiac enzymes (EKG showing Q waves in Lead 1 and aVL, incomplete RBBB; troponinemia likely 2/2 shock). Pt received bicarb replacement for acidosis and several amps of dextrose for persistent hypoglycemia. She was also started on N-acetylcysteine for acetaminophen toxicity and received CVVHD for persistent acidosis. Pt with progressive liver failure, troponinemia, lactic acidosis, electrolyte derangements, and coagulopathy despite treatment. Pt will likely need a liver transplant at [facility] and is stable for transfer.    Upon discharge:   Vitals:  Tm 97.1 (R) off warming blanket; repeat T 95 placed back on warming blanket   (NSR)  BP SBP 110s/70s, MAP 80 on levophed 15mcg  RR 27  SaO2 100%    Ventilator settings:  FiO2 40%    RR 22  Ti 1  PEEP 5    Lines:  RIJ dialysis catheter triple lumen (placed 12/17/18)  L subclavian triple lumen (placed 12/16/18)  L axillary arterial line (placed 12/17/18)  Peripherals b/l UE and b/l LE    Mo catheter: 0 UOP  NGT: 100cc in 12hrs  CVVHD (started 12/17 ~1pm)    Patient hemodynamically stable for transfer to [facility].

## 2018-12-17 NOTE — DISCHARGE NOTE ADULT - PLAN OF CARE
Prevent recurrence You were brought in to the hospital after being found down at your home. In the hospital, we found that you were in multi organ failure. You had several concurrent issues included difficulty breathing, acidity of your blood, low glucose, imbalance of your electrolytes. We had to intubate you to help you breathe, as well as give you dialysis. However you had persistent liver failure, possibly from tylenol, and will need a liver transplant at [facility]. You were brought in to the hospital after being found down at your home. In the hospital, we found that you were in multi organ failure. We suspect that this due to too much tylenol which caused liver failure. We had to intubate you to help you breathe, gave you antibiotics, fluid, and medication, and we also started you on dialysis. However you had persistent liver failure and will need a liver transplant at [facility].

## 2018-12-17 NOTE — CHART NOTE - NSCHARTNOTEFT_GEN_A_CORE
Infectious Diseases Anti-infective Approval Note    Medication: linezolid  Dose: 600mg  Route: IV/PO  Frequency: q12h  Duration: 5 days    Dose may be adjusted as needed for alterations in renal function.    *THIS IS NOT AN INFECTIOUS DISEASES CONSULTATION*

## 2018-12-17 NOTE — PROVIDER CONTACT NOTE (CRITICAL VALUE NOTIFICATION) - BACKGROUND
Patient collapsed in apartment, EMS intubated on arrival and been treated for septic shock with hepatic and renal failure ever since.

## 2018-12-17 NOTE — PROVIDER CONTACT NOTE (CRITICAL VALUE NOTIFICATION) - RECOMMENDATIONS
None at this time.  Pm rounds concluded less than 1 hr ago, plan was no cryoprecipitate unless patient showing signs of bleeding or fibrinogen level drops below 150.

## 2018-12-17 NOTE — PATIENT PROFILE ADULT - STATED REASON FOR ADMISSION
Patient niece attempting to get in contact with patient.  Doorman of building sent to room and found patient unresponsive in apartment

## 2018-12-17 NOTE — CONSULT NOTE ADULT - PROBLEM SELECTOR RECOMMENDATION 2
anuric   DDX: ATN  UA noted  patient started on RRT-  will continue monitoring renal function and urine output   renally dose abx

## 2018-12-17 NOTE — PROVIDER CONTACT NOTE (CRITICAL VALUE NOTIFICATION) - ASSESSMENT
patient remains critically ill, with levophed requirements increasing, to unstable for dialysis, CVVHD in progress.

## 2018-12-17 NOTE — PROCEDURE NOTE - NSPROCDETAILS_GEN_ALL_CORE
lumen(s) aspirated and flushed/sterile dressing applied/guidewire recovered
location identified, draped/prepped, sterile technique used, needle inserted/introduced/connected to a pressurized flush line/hemostasis with direct pressure, dressing applied/positive blood return obtained via catheter/sutured in place/ultrasound guidance
guidewire recovered/sterile dressing applied/sterile technique, catheter placed/ultrasound guidance/lumen(s) aspirated and flushed

## 2018-12-17 NOTE — PROGRESS NOTE ADULT - ASSESSMENT
73F with PMH Alcohol abuse, previous ER visits for fracture/dislocation BIBA for agitation, found to be in severe sepsis and multiorgan failure.     #Severe sepsis: Unknown source  - f/u UA, blood cx and CXR  - Start zosyn and linezolid (2/2 unspecified vancomycin allgergy)  - S/p appropriate fluid resuscitation  - Trend lactate  - Trend CBC  - Check sputum cx    #ARF: Unknown etiology  - Trend BMP  - Trend UOP  - F/u urine lytes    #AMS:   - CT head negative for acute stroke  - Pt hypoglycemic on presentation improved w/ D50 x 2  - Trend FS q 1 hrs  - f/u urine tox  - OG tube to suction to reduce chance of aspiration  - Repeat CMP    #Troponinemia:   - Q waves in Lead 1 and aVL, incomplete RBBB  - Trend cardiac enzymes to peak    Dispo: MICU  DNR confirmed w/ HCP Assessment and Plan:   73F with PMH Alcohol abuse, previous ER visits for fracture/dislocation BIBA for agitation, found to be in severe sepsis and multiorgan failure, s/p intubation, s/p HD catheter placement for dialysis.     Neurology:  - intubated, sedated   #Toxic metabolic encephalopathy - severe hypoglycemia 2/2 liver failure vs. septic shock, multiple electrolyte derangements. CT head negative for acute intracranial process  - Trend FS q 1 hrs    Cardiology  #hemodynamics   Currently hemodynamically unstable, requiring high amounts of levophed  Will under CRRT in the setting of severe metabolic acidosis     #troponinemia 0.11 on admission, now uptrending, likely secondary to hypoperfusion/ischemia in setting of multi-organ failure   - Q waves in Lead 1 and aVL, incomplete RBBB  - Trend cardiac enzymes to peak    Pulmonary:  #Patient intubated for airway protection in setting of multi-organ failure  - continue AC/MV  - f/u serial ABGs    GI:  #Anion gap metabolic acidosis in setting lactic acidosis - etiologies includes salicylate tox vs. ?tylenol tox   - f/u salicylate levels   - Trend CMPs    # Fulminant hepatic failure - likely ischemic hepatitis, with severe transaminitis, coagulopathy - likely due to severe hypoperfusion in setting of lactic acidosis vs. acetaminophen toxicity (elevated to 46)  - with suspicion for possible tylenol toxicity, started N-acetylcysteine   - persistently elevated LFTs, ALT >7000s, AST 4251, INR uptrending to 6   - trend CMPs  - discussed with [facility] for possible ?liver transplant, although patient has history of alcohol abuse. Will follow up    ID:   #Severe sepsis present on admission requiring pressors, lactate 21, hypothermic 91, unclear source. Started on broad spectrum abx and adequately fluid resuscitated.   - f/u BCx, UCx, sputum cx   - continue Zosyn, Linezolid (started 12/16)     Renal   #Acute renal failure - unclear etiology, now oligo/anuric, started on CRRT   - trend BMPs  - Nephrology following   - continue CRRT    Endo:   #Persistent hypoglycemia - likely due to hepatic dysfunction, decreased gluconeogenesis   - continue D10 at 125/hr    FEN: NPO, Replete lytes PRN K>4, Mg>2  Lines/Mo: L subclavian, R HD [facility]  PPx: Hep SQ, SCDs  Code: DNR, patient's niece is HCP, would like to pursue HD, SIMEON in chart     Dispo: Patient requires continued monitoring in MICU Assessment and Plan:   73F with PMH Alcohol abuse, previous ER visits for fracture/dislocation BIBA for agitation, found to be in severe sepsis and multiorgan failure, s/p intubation, s/p HD catheter placement for dialysis.     Neurology:  - intubated, sedated   #Toxic metabolic encephalopathy - severe hypoglycemia 2/2 liver failure vs. septic shock, multiple electrolyte derangements. CT head negative for acute intracranial process  - Trend FS q 1 hrs    Cardiology  #hemodynamics   Currently hemodynamically unstable, requiring high amounts of levophed  Will undergo CRRT in the setting of severe metabolic acidosis     #troponinemia 0.11 on admission, now uptrending, likely secondary to hypoperfusion/ischemia in setting of multi-organ failure   - Q waves in Lead 1 and aVL, incomplete RBBB  - Trend cardiac enzymes to peak    Pulmonary:  #Patient intubated for airway protection in setting of multi-organ failure  - continue AC/MV  - f/u serial ABGs    GI:  #Anion gap metabolic acidosis in setting lactic acidosis - secondary to fulimant hepatic failure  - f/u salicylate levels   - Trend CMPs    # Fulminant hepatic failure - likely ischemic hepatitis, with severe transaminitis, coagulopathy - likely due to severe hypoperfusion in setting of lactic acidosis vs. acetaminophen toxicity (elevated to 46)  - with suspicion for possible tylenol toxicity, started N-acetylcysteine   - persistently elevated LFTs, ALT >7000s, AST 4251, INR uptrending to 6   - trend CMPs  - discussed with [facility] for possible ?liver transplant, although patient has history of alcohol abuse. Will follow up    ID:   #Severe sepsis present on admission requiring pressors, lactate 21, hypothermic 91, unclear source. Started on broad spectrum abx and adequately fluid resuscitated.   - f/u BCx, UCx, sputum cx   - continue Zosyn, Linezolid (started 12/16)     Renal   #Acute renal failure - unclear etiology, now oligo/anuric, started on CRRT   - trend BMPs  - Nephrology following   - continue CRRT    Endo:   #Persistent hypoglycemia - likely due to hepatic dysfunction, decreased gluconeogenesis   - continue D10 at 125/hr    FEN: NPO, Replete lytes PRN K>4, Mg>2  Lines/Mo: L subclavian, R HD [facility]  PPx: Hep SQ, SCDs  Code: DNR, patient's niece is HCP, would like to pursue HD, MOLST in chart     Dispo: Patient requires continued monitoring in MICU Assessment and Plan:   73F with PMH Alcohol abuse, previous ER visits for fracture/dislocation BIBA for agitation, found to be in severe sepsis and multiorgan failure, s/p intubation, s/p HD catheter placement for dialysis.     Neurology:  - intubated, sedated   #Toxic metabolic encephalopathy - severe hypoglycemia 2/2 liver failure vs. septic shock, multiple electrolyte derangements. CT head negative for acute intracranial process  - Trend FS q 1 hrs    Cardiology  #hemodynamics   Currently hemodynamically unstable, requiring high amounts of levophed  Will undergo CRRT in the setting of severe metabolic acidosis     #troponinemia 0.11 on admission, now uptrending, likely secondary to hypoperfusion/ischemia in setting of multi-organ failure   - Q waves in Lead 1 and aVL, incomplete RBBB  - Trend cardiac enzymes to peak    Pulmonary:  #Patient intubated for airway protection in setting of multi-organ failure  - continue AC/MV  - f/u serial ABGs    GI:  #Anion gap metabolic acidosis in setting lactic acidosis - secondary to fulimant hepatic failure  - f/u salicylate levels   - Trend CMPs    # Fulminant hepatic failure - likely ischemic hepatitis, with severe transaminitis, coagulopathy - likely due to severe hypoperfusion in setting of lactic acidosis vs. acetaminophen toxicity (elevated to 46)  - with suspicion for possible tylenol toxicity, started N-acetylcysteine   - persistently elevated LFTs, ALT >7000s, AST 4251, INR uptrending to 6   - trend CMPs  - discussed with [facility] for possible transfer for ?liver transplant, although patient has history of alcohol abuse. Will follow up    ID:   #Severe sepsis present on admission requiring pressors, lactate 21, hypothermic 91, unclear source. Started on broad spectrum abx and adequately fluid resuscitated.   - f/u BCx, UCx, sputum cx   - continue Zosyn, Linezolid (started 12/16)     Renal   #Acute renal failure - unclear etiology, now oligo/anuric, started on CRRT   - trend BMPs  - Nephrology following   - continue CRRT    Endo:   #Persistent hypoglycemia - likely due to hepatic dysfunction, decreased gluconeogenesis   - continue D10 at 125/hr    FEN: NPO, Replete lytes PRN K>4, Mg>2  Lines/Mo: L subclavian, R HD [facility]  PPx: Hep SQ, SCDs  Code: DNR, patient's niece is HCP, would like to pursue HD, SIMEON in chart     Dispo: Patient requires continued monitoring in MICU

## 2018-12-17 NOTE — DISCHARGE NOTE ADULT - MEDICATION SUMMARY - MEDICATIONS TO STOP TAKING
I will STOP taking the medications listed below when I get home from the hospital:    Percocet 5/325 oral tablet  -- 1 tab(s) by mouth every 6 hours MDD:4 tabs  -- Caution federal law prohibits the transfer of this drug to any person other  than the person for whom it was prescribed.  May cause drowsiness.  Alcohol may intensify this effect.  Use care when operating dangerous machinery.  This prescription cannot be refilled.  This product contains acetaminophen.  Do not use  with any other product containing acetaminophen to prevent possible liver damage.  Using more of this medication than prescribed may cause serious breathing problems.    Percocet 5/325 oral tablet  -- 1 tab(s) by mouth every 6 hours MDD:4 tabs  -- Caution federal law prohibits the transfer of this drug to any person other  than the person for whom it was prescribed.  May cause drowsiness.  Alcohol may intensify this effect.  Use care when operating dangerous machinery.  This prescription cannot be refilled.  This product contains acetaminophen.  Do not use  with any other product containing acetaminophen to prevent possible liver damage.  Using more of this medication than prescribed may cause serious breathing problems.    Percocet 5/325 oral tablet  -- 1 tab(s) by mouth every 6 hours MDD:4 tabs  -- Caution federal law prohibits the transfer of this drug to any person other  than the person for whom it was prescribed.  May cause drowsiness.  Alcohol may intensify this effect.  Use care when operating dangerous machinery.  This prescription cannot be refilled.  This product contains acetaminophen.  Do not use  with any other product containing acetaminophen to prevent possible liver damage.  Using more of this medication than prescribed may cause serious breathing problems.

## 2018-12-17 NOTE — PROVIDER CONTACT NOTE (CRITICAL VALUE NOTIFICATION) - SITUATION
Patient critically ill with multiple abnormal lab values.  Patient collapsed in apartment, intubated on arrival by EMS, being treated for sepsis, renal and hepatic failures ever since. patient remains critically ill, with levophed requirements increasing, to unstable for dialysis, CVVHD to begin.  None at this time.

## 2018-12-17 NOTE — DISCHARGE NOTE ADULT - MEDICATION SUMMARY - MEDICATIONS TO TAKE
I will START or STAY ON the medications listed below when I get home from the hospital:    CRRT Treatment  -- Indication: For Lactic acidosis    PureFlow Dialysate RFP-400 (K 2 / Ca 3)  -- 5000 milliliter(s) irrigation   -- Indication: For Lactic acidosis    fentaNYL  -- 5.24 milliliter(s) intravenous every hour  -- Indication: For sedation    vasopressin 20 units/mL injectable solution  -- 0.6 milliliter(s) injectable every hour  -- Indication: For hypotension    acetylcysteine 20% intravenous solution  -- 25 milliliter(s) intravenous once  -- Indication: For Eleveated acetominophen     chlorhexidine 2% topical pad  -- 1 application on skin   -- Indication: For prophylaxis    chlorhexidine 0.12% mucous membrane liquid  -- 15 milliliter(s) mucous membrane every 12 hours  -- Indication: For prophylaxis    norepinephrine  -- 65 microgram(s) intravenous every hour  -- Indication: For hypotension    Dextrose 5% in Water intravenous solution  -- 1000 milliliter(s) intravenous   -- Indication: For hypoglycemia    Dextrose 10% in Water intravenous solution  -- 1000 milliliter(s) intravenous   -- Indication: For hypoglycemia    ocular lubricant ophthalmic ointment  -- 1 application to each affected eye 3 times a day  -- Indication: For dry eye    linezolid 2 mg/mL-D5% intravenous solution  -- 300 milliliter(s) intravenous every 12 hours  -- Indication: For sepsis    piperacillin-tazobactam 2 g-0.25 g intravenous injection  -- 3.375 gram(s) intravenous every 6 hours  -- Indication: For sepsis    pantoprazole 40 mg intravenous injection  -- 40 milligram(s) intravenous 2 times a day  -- Indication: For prophylaxis

## 2018-12-17 NOTE — DISCHARGE NOTE ADULT - MEDICATION SUMMARY - MEDICATIONS TO CHANGE
no gum bleeding/no nose bleeding
I will SWITCH the dose or number of times a day I take the medications listed below when I get home from the hospital:  None

## 2018-12-17 NOTE — PROVIDER CONTACT NOTE (CRITICAL VALUE NOTIFICATION) - BACKGROUND
Patient collapsed in apartment, intubated on arrival by EMS, being treated for sepsis, renal and hepatic failures ever since.

## 2018-12-17 NOTE — CONSULT NOTE ADULT - ASSESSMENT
73F with PMH Alcohol abuse, previous ER visits for fracture/dislocation BIBA for agitation, found to be in severe sepsis and multiorgan failure.     #Severe sepsis: Unknown source  - f/u UA, blood cx and CXR  - Start zosyn and linezolid (2/2 unspecified vancomycin allgergy)  - S/p appropriate fluid resuscitation  - Trend lactate  - Trend CBC  - Check sputum cx    #ARF: Unknown etiology  - Trend BMP  - Trend UOP  - F/u urine lytes    #AMS:   - CT head negative for acute stroke  - Pt hypoglycemic on presentation improved w/ D50 x 2  - Trend FS q 1 hrs  - f/u urine tox  - OG tube to suction to reduce chance of aspiration  - Repeat CMP    #Troponinemia:   - Q waves in Lead 1 and aVL, incomplete RBBB  - Trend cardiac enzymes to peak    Dispo: MICU  DNR confirmed w/ HCP
Patient is a 73 y o  female with PMH of alcoholism, who was brought to ED after being found unresponsive.   Nephrology consult is placed in regards to acidosis and brigid.
72 yo F with pmh of alcohol abuse?, previous Er visits for fracture/dislocation BIBA for agitation found to have multiorgan failure on labs and was admitted to MICU for further management.    - Symptoms likely 2/2 toxic metabolic encephalopathy, less likely to be CVA.  - Further management per MICU team.   - Further neurological imaging once patient more stable     Stroke team will follow

## 2018-12-18 VITALS — HEART RATE: 114 BPM | OXYGEN SATURATION: 98 % | RESPIRATION RATE: 24 BRPM

## 2018-12-18 LAB
CULTURE RESULTS: NO GROWTH — SIGNIFICANT CHANGE UP
SPECIMEN SOURCE: SIGNIFICANT CHANGE UP

## 2018-12-18 PROCEDURE — 81001 URINALYSIS AUTO W/SCOPE: CPT

## 2018-12-18 PROCEDURE — 80320 DRUG SCREEN QUANTALCOHOLS: CPT

## 2018-12-18 PROCEDURE — 70450 CT HEAD/BRAIN W/O DYE: CPT

## 2018-12-18 PROCEDURE — 85384 FIBRINOGEN ACTIVITY: CPT

## 2018-12-18 PROCEDURE — 82330 ASSAY OF CALCIUM: CPT

## 2018-12-18 PROCEDURE — 84300 ASSAY OF URINE SODIUM: CPT

## 2018-12-18 PROCEDURE — 99292 CRITICAL CARE ADDL 30 MIN: CPT | Mod: 25

## 2018-12-18 PROCEDURE — 82533 TOTAL CORTISOL: CPT

## 2018-12-18 PROCEDURE — 94003 VENT MGMT INPAT SUBQ DAY: CPT

## 2018-12-18 PROCEDURE — 83935 ASSAY OF URINE OSMOLALITY: CPT

## 2018-12-18 PROCEDURE — 85220 BLOOC CLOT FACTOR V TEST: CPT

## 2018-12-18 PROCEDURE — 85210 CLOT FACTOR II PROTHROM SPEC: CPT

## 2018-12-18 PROCEDURE — 83605 ASSAY OF LACTIC ACID: CPT

## 2018-12-18 PROCEDURE — 84295 ASSAY OF SERUM SODIUM: CPT

## 2018-12-18 PROCEDURE — 86850 RBC ANTIBODY SCREEN: CPT

## 2018-12-18 PROCEDURE — 85610 PROTHROMBIN TIME: CPT

## 2018-12-18 PROCEDURE — 84484 ASSAY OF TROPONIN QUANT: CPT

## 2018-12-18 PROCEDURE — 71045 X-RAY EXAM CHEST 1 VIEW: CPT

## 2018-12-18 PROCEDURE — 85025 COMPLETE CBC W/AUTO DIFF WBC: CPT

## 2018-12-18 PROCEDURE — 84132 ASSAY OF SERUM POTASSIUM: CPT

## 2018-12-18 PROCEDURE — 82962 GLUCOSE BLOOD TEST: CPT

## 2018-12-18 PROCEDURE — 86901 BLOOD TYPING SEROLOGIC RH(D): CPT

## 2018-12-18 PROCEDURE — 31500 INSERT EMERGENCY AIRWAY: CPT

## 2018-12-18 PROCEDURE — 71250 CT THORAX DX C-: CPT

## 2018-12-18 PROCEDURE — 99291 CRITICAL CARE FIRST HOUR: CPT | Mod: 25

## 2018-12-18 PROCEDURE — 86900 BLOOD TYPING SEROLOGIC ABO: CPT

## 2018-12-18 PROCEDURE — 85230 CLOT FACTOR VII PROCONVERTIN: CPT

## 2018-12-18 PROCEDURE — 84443 ASSAY THYROID STIM HORMONE: CPT

## 2018-12-18 PROCEDURE — 74176 CT ABD & PELVIS W/O CONTRAST: CPT

## 2018-12-18 PROCEDURE — 84100 ASSAY OF PHOSPHORUS: CPT

## 2018-12-18 PROCEDURE — 82550 ASSAY OF CK (CPK): CPT

## 2018-12-18 PROCEDURE — 83036 HEMOGLOBIN GLYCOSYLATED A1C: CPT

## 2018-12-18 PROCEDURE — 83735 ASSAY OF MAGNESIUM: CPT

## 2018-12-18 PROCEDURE — 80053 COMPREHEN METABOLIC PANEL: CPT

## 2018-12-18 PROCEDURE — 87040 BLOOD CULTURE FOR BACTERIA: CPT

## 2018-12-18 PROCEDURE — 82553 CREATINE MB FRACTION: CPT

## 2018-12-18 PROCEDURE — 72125 CT NECK SPINE W/O DYE: CPT

## 2018-12-18 PROCEDURE — 82693 ASSAY OF ETHYLENE GLYCOL: CPT

## 2018-12-18 PROCEDURE — 87086 URINE CULTURE/COLONY COUNT: CPT

## 2018-12-18 PROCEDURE — 85027 COMPLETE CBC AUTOMATED: CPT

## 2018-12-18 PROCEDURE — 82803 BLOOD GASES ANY COMBINATION: CPT

## 2018-12-18 PROCEDURE — 96374 THER/PROPH/DIAG INJ IV PUSH: CPT | Mod: XU

## 2018-12-18 PROCEDURE — 80307 DRUG TEST PRSMV CHEM ANLYZR: CPT

## 2018-12-18 PROCEDURE — 84540 ASSAY OF URINE/UREA-N: CPT

## 2018-12-18 PROCEDURE — 85730 THROMBOPLASTIN TIME PARTIAL: CPT

## 2018-12-18 PROCEDURE — 83930 ASSAY OF BLOOD OSMOLALITY: CPT

## 2018-12-18 PROCEDURE — 82570 ASSAY OF URINE CREATININE: CPT

## 2018-12-18 PROCEDURE — 36415 COLL VENOUS BLD VENIPUNCTURE: CPT

## 2018-12-18 PROCEDURE — 74018 RADEX ABDOMEN 1 VIEW: CPT

## 2018-12-19 LAB — ETHYLENE GLYCOL SERPLBLD-MCNC: SIGNIFICANT CHANGE UP MG/DL

## 2018-12-20 DIAGNOSIS — J96.00 ACUTE RESPIRATORY FAILURE, UNSPECIFIED WHETHER WITH HYPOXIA OR HYPERCAPNIA: ICD-10-CM

## 2018-12-20 DIAGNOSIS — E16.2 HYPOGLYCEMIA, UNSPECIFIED: ICD-10-CM

## 2018-12-20 DIAGNOSIS — N17.0 ACUTE KIDNEY FAILURE WITH TUBULAR NECROSIS: ICD-10-CM

## 2018-12-20 DIAGNOSIS — E87.4 MIXED DISORDER OF ACID-BASE BALANCE: ICD-10-CM

## 2018-12-20 DIAGNOSIS — I10 ESSENTIAL (PRIMARY) HYPERTENSION: ICD-10-CM

## 2018-12-20 DIAGNOSIS — R65.21 SEVERE SEPSIS WITH SEPTIC SHOCK: ICD-10-CM

## 2018-12-20 DIAGNOSIS — A41.9 SEPSIS, UNSPECIFIED ORGANISM: ICD-10-CM

## 2018-12-20 DIAGNOSIS — K71.10 TOXIC LIVER DISEASE WITH HEPATIC NECROSIS, WITHOUT COMA: ICD-10-CM

## 2018-12-20 DIAGNOSIS — K72.90 HEPATIC FAILURE, UNSPECIFIED WITHOUT COMA: ICD-10-CM

## 2018-12-20 DIAGNOSIS — D68.4 ACQUIRED COAGULATION FACTOR DEFICIENCY: ICD-10-CM

## 2018-12-20 DIAGNOSIS — T39.1X1A POISONING BY 4-AMINOPHENOL DERIVATIVES, ACCIDENTAL (UNINTENTIONAL), INITIAL ENCOUNTER: ICD-10-CM

## 2018-12-20 DIAGNOSIS — G92 TOXIC ENCEPHALOPATHY: ICD-10-CM

## 2018-12-20 DIAGNOSIS — F10.20 ALCOHOL DEPENDENCE, UNCOMPLICATED: ICD-10-CM

## 2018-12-20 DIAGNOSIS — E78.5 HYPERLIPIDEMIA, UNSPECIFIED: ICD-10-CM

## 2018-12-21 LAB
CULTURE RESULTS: SIGNIFICANT CHANGE UP
CULTURE RESULTS: SIGNIFICANT CHANGE UP
SPECIMEN SOURCE: SIGNIFICANT CHANGE UP
SPECIMEN SOURCE: SIGNIFICANT CHANGE UP

## 2023-09-14 NOTE — ED PROVIDER NOTE - CARDIAC, MLM
History & Physical Reviewed:   I have reviewed the History and Physical dated:  14-Mar-2023   History and Physical reviewed and relevant findings noted. Patient examined to review pertinent physical  findings.: No significant changes   Home Medications Reviewed: no changes noted   Allergies Reviewed: no changes noted       ERAS (Enhanced Recovery After Surgery):  ·  ERAS Patient: no     Consent:   COVID-19 Consent:  ·  COVID-19 Risk Consent Surgeon has reviewed key risks related to the risk of jamal COVID-19 and if they contract COVID-19 what the risks are.     Attestation:   Note Completion:  I am a:  Resident/Fellow   Attending Attestation I saw and evaluated the patient.  I personally obtained the key and critical portions of the history and physical exam or was physically present for key and  critical portions performed by the resident/fellow. I reviewed the resident/fellow?s documentation and discussed the patient with the resident/fellow.  I agree with the resident/fellow?s medical decision making as documented in the note.     I personally evaluated the patient on 22-Mar-2023         Electronic Signatures:  Ollie Quan (Resident))  (Signed 22-Mar-2023 10:15)   Authored: History & Physical Reviewed, ERAS, Consent,  Note Completion  Sahil Galindo)  (Signed 22-Mar-2023 12:44)   Authored: Note Completion   Co-Signer: History & Physical Reviewed, ERAS, Consent, Note Completion      Last Updated: 22-Mar-2023 12:44 by Sahil Galindo)   
Normal rate, regular rhythm.  Heart sounds S1, S2.  No murmurs, rubs or gallops.
